# Patient Record
Sex: MALE | Race: WHITE | NOT HISPANIC OR LATINO
[De-identification: names, ages, dates, MRNs, and addresses within clinical notes are randomized per-mention and may not be internally consistent; named-entity substitution may affect disease eponyms.]

---

## 2017-08-07 ENCOUNTER — MESSAGE (OUTPATIENT)
Age: 72
End: 2017-08-07

## 2017-11-22 ENCOUNTER — INPATIENT (INPATIENT)
Facility: HOSPITAL | Age: 72
LOS: 2 days | Discharge: HOME | End: 2017-11-25
Attending: FAMILY MEDICINE | Admitting: FAMILY MEDICINE
Payer: MEDICARE

## 2017-11-22 VITALS
TEMPERATURE: 103 F | WEIGHT: 205.91 LBS | OXYGEN SATURATION: 99 % | SYSTOLIC BLOOD PRESSURE: 128 MMHG | RESPIRATION RATE: 18 BRPM | HEIGHT: 69 IN | DIASTOLIC BLOOD PRESSURE: 82 MMHG | HEART RATE: 106 BPM

## 2017-11-22 DIAGNOSIS — E89.0 POSTPROCEDURAL HYPOTHYROIDISM: Chronic | ICD-10-CM

## 2017-11-22 DIAGNOSIS — E11.9 TYPE 2 DIABETES MELLITUS WITHOUT COMPLICATIONS: ICD-10-CM

## 2017-11-22 DIAGNOSIS — Z98.89 OTHER SPECIFIED POSTPROCEDURAL STATES: Chronic | ICD-10-CM

## 2017-11-22 DIAGNOSIS — Z87.442 PERSONAL HISTORY OF URINARY CALCULI: Chronic | ICD-10-CM

## 2017-11-22 DIAGNOSIS — D68.61 ANTIPHOSPHOLIPID SYNDROME: ICD-10-CM

## 2017-11-22 DIAGNOSIS — I10 ESSENTIAL (PRIMARY) HYPERTENSION: ICD-10-CM

## 2017-11-22 DIAGNOSIS — H26.9 UNSPECIFIED CATARACT: Chronic | ICD-10-CM

## 2017-11-22 DIAGNOSIS — F32.9 MAJOR DEPRESSIVE DISORDER, SINGLE EPISODE, UNSPECIFIED: ICD-10-CM

## 2017-11-22 DIAGNOSIS — Z90.49 ACQUIRED ABSENCE OF OTHER SPECIFIED PARTS OF DIGESTIVE TRACT: Chronic | ICD-10-CM

## 2017-11-22 DIAGNOSIS — J18.9 PNEUMONIA, UNSPECIFIED ORGANISM: ICD-10-CM

## 2017-11-22 DIAGNOSIS — G70.00 MYASTHENIA GRAVIS WITHOUT (ACUTE) EXACERBATION: ICD-10-CM

## 2017-11-22 LAB
ALBUMIN SERPL ELPH-MCNC: 3.5 G/DL — SIGNIFICANT CHANGE UP (ref 3.3–5)
ALP SERPL-CCNC: 80 U/L — SIGNIFICANT CHANGE UP (ref 40–120)
ALT FLD-CCNC: 21 U/L — SIGNIFICANT CHANGE UP (ref 12–78)
ANION GAP SERPL CALC-SCNC: 8 MMOL/L — SIGNIFICANT CHANGE UP (ref 5–17)
APPEARANCE UR: CLEAR — SIGNIFICANT CHANGE UP
APTT BLD: 46.4 SEC — HIGH (ref 27.5–37.4)
AST SERPL-CCNC: 17 U/L — SIGNIFICANT CHANGE UP (ref 15–37)
BACTERIA # UR AUTO: ABNORMAL
BASOPHILS # BLD AUTO: 0.1 K/UL — SIGNIFICANT CHANGE UP (ref 0–0.2)
BASOPHILS NFR BLD AUTO: 0.7 % — SIGNIFICANT CHANGE UP (ref 0–2)
BILIRUB SERPL-MCNC: 0.4 MG/DL — SIGNIFICANT CHANGE UP (ref 0.2–1.2)
BILIRUB UR-MCNC: NEGATIVE — SIGNIFICANT CHANGE UP
BUN SERPL-MCNC: 26 MG/DL — HIGH (ref 7–23)
CALCIUM SERPL-MCNC: 8.7 MG/DL — SIGNIFICANT CHANGE UP (ref 8.5–10.1)
CHLORIDE SERPL-SCNC: 105 MMOL/L — SIGNIFICANT CHANGE UP (ref 96–108)
CO2 SERPL-SCNC: 25 MMOL/L — SIGNIFICANT CHANGE UP (ref 22–31)
COLOR SPEC: YELLOW — SIGNIFICANT CHANGE UP
CREAT SERPL-MCNC: 1.69 MG/DL — HIGH (ref 0.5–1.3)
DIFF PNL FLD: NEGATIVE — SIGNIFICANT CHANGE UP
EOSINOPHIL # BLD AUTO: 0.1 K/UL — SIGNIFICANT CHANGE UP (ref 0–0.5)
EOSINOPHIL NFR BLD AUTO: 0.6 % — SIGNIFICANT CHANGE UP (ref 0–6)
EPI CELLS # UR: SIGNIFICANT CHANGE UP
FLUAV SPEC QL CULT: NEGATIVE — SIGNIFICANT CHANGE UP
FLUBV AG SPEC QL IA: NEGATIVE — SIGNIFICANT CHANGE UP
GLUCOSE BLDC GLUCOMTR-MCNC: 143 MG/DL — HIGH (ref 70–99)
GLUCOSE BLDC GLUCOMTR-MCNC: 199 MG/DL — HIGH (ref 70–99)
GLUCOSE SERPL-MCNC: 192 MG/DL — HIGH (ref 70–99)
GLUCOSE UR QL: NEGATIVE MG/DL — SIGNIFICANT CHANGE UP
HCT VFR BLD CALC: 39.8 % — SIGNIFICANT CHANGE UP (ref 39–50)
HGB BLD-MCNC: 12.8 G/DL — LOW (ref 13–17)
INR BLD: 3.27 RATIO — HIGH (ref 0.88–1.16)
KETONES UR-MCNC: NEGATIVE — SIGNIFICANT CHANGE UP
LACTATE SERPL-SCNC: 1.6 MMOL/L — SIGNIFICANT CHANGE UP (ref 0.7–2)
LEUKOCYTE ESTERASE UR-ACNC: NEGATIVE — SIGNIFICANT CHANGE UP
LYMPHOCYTES # BLD AUTO: 0.6 K/UL — LOW (ref 1–3.3)
LYMPHOCYTES # BLD AUTO: 5.2 % — LOW (ref 13–44)
MCHC RBC-ENTMCNC: 27.9 PG — SIGNIFICANT CHANGE UP (ref 27–34)
MCHC RBC-ENTMCNC: 32.2 GM/DL — SIGNIFICANT CHANGE UP (ref 32–36)
MCV RBC AUTO: 86.8 FL — SIGNIFICANT CHANGE UP (ref 80–100)
MONOCYTES # BLD AUTO: 1 K/UL — HIGH (ref 0–0.9)
MONOCYTES NFR BLD AUTO: 9.1 % — SIGNIFICANT CHANGE UP (ref 2–14)
NEUTROPHILS # BLD AUTO: 9.7 K/UL — HIGH (ref 1.8–7.4)
NEUTROPHILS NFR BLD AUTO: 84.4 % — HIGH (ref 43–77)
NITRITE UR-MCNC: NEGATIVE — SIGNIFICANT CHANGE UP
PH UR: 5 — SIGNIFICANT CHANGE UP (ref 5–8)
PLATELET # BLD AUTO: 327 K/UL — SIGNIFICANT CHANGE UP (ref 150–400)
POTASSIUM SERPL-MCNC: 4.6 MMOL/L — SIGNIFICANT CHANGE UP (ref 3.5–5.3)
POTASSIUM SERPL-SCNC: 4.6 MMOL/L — SIGNIFICANT CHANGE UP (ref 3.5–5.3)
PROT SERPL-MCNC: 7.5 GM/DL — SIGNIFICANT CHANGE UP (ref 6–8.3)
PROT UR-MCNC: 30 MG/DL
PROTHROM AB SERPL-ACNC: 36.5 SEC — HIGH (ref 9.8–12.7)
RBC # BLD: 4.59 M/UL — SIGNIFICANT CHANGE UP (ref 4.2–5.8)
RBC # FLD: 13 % — SIGNIFICANT CHANGE UP (ref 11–15)
RBC CASTS # UR COMP ASSIST: SIGNIFICANT CHANGE UP /HPF (ref 0–4)
SODIUM SERPL-SCNC: 138 MMOL/L — SIGNIFICANT CHANGE UP (ref 135–145)
SP GR SPEC: 1.01 — SIGNIFICANT CHANGE UP (ref 1.01–1.02)
UROBILINOGEN FLD QL: NEGATIVE MG/DL — SIGNIFICANT CHANGE UP
WBC # BLD: 11.4 K/UL — HIGH (ref 3.8–10.5)
WBC # FLD AUTO: 11.4 K/UL — HIGH (ref 3.8–10.5)
WBC UR QL: SIGNIFICANT CHANGE UP

## 2017-11-22 PROCEDURE — 72170 X-RAY EXAM OF PELVIS: CPT | Mod: 26

## 2017-11-22 PROCEDURE — 71010: CPT | Mod: 26

## 2017-11-22 PROCEDURE — 70450 CT HEAD/BRAIN W/O DYE: CPT | Mod: 26

## 2017-11-22 PROCEDURE — 99223 1ST HOSP IP/OBS HIGH 75: CPT | Mod: AI

## 2017-11-22 PROCEDURE — 99285 EMERGENCY DEPT VISIT HI MDM: CPT

## 2017-11-22 PROCEDURE — 72125 CT NECK SPINE W/O DYE: CPT | Mod: 26

## 2017-11-22 PROCEDURE — 76377 3D RENDER W/INTRP POSTPROCES: CPT | Mod: 26

## 2017-11-22 RX ORDER — VENLAFAXINE HCL 75 MG
75 CAPSULE, EXT RELEASE 24 HR ORAL
Qty: 0 | Refills: 0 | Status: DISCONTINUED | OUTPATIENT
Start: 2017-11-22 | End: 2017-11-25

## 2017-11-22 RX ORDER — SODIUM CHLORIDE 9 MG/ML
1000 INJECTION, SOLUTION INTRAVENOUS
Qty: 0 | Refills: 0 | Status: DISCONTINUED | OUTPATIENT
Start: 2017-11-22 | End: 2017-11-25

## 2017-11-22 RX ORDER — SODIUM CHLORIDE 9 MG/ML
500 INJECTION INTRAMUSCULAR; INTRAVENOUS; SUBCUTANEOUS ONCE
Qty: 0 | Refills: 0 | Status: COMPLETED | OUTPATIENT
Start: 2017-11-22 | End: 2017-11-22

## 2017-11-22 RX ORDER — AZITHROMYCIN 500 MG/1
500 TABLET, FILM COATED ORAL EVERY 24 HOURS
Qty: 0 | Refills: 0 | Status: DISCONTINUED | OUTPATIENT
Start: 2017-11-23 | End: 2017-11-25

## 2017-11-22 RX ORDER — FAMOTIDINE 10 MG/ML
20 INJECTION INTRAVENOUS DAILY
Qty: 0 | Refills: 0 | Status: DISCONTINUED | OUTPATIENT
Start: 2017-11-22 | End: 2017-11-25

## 2017-11-22 RX ORDER — CARVEDILOL PHOSPHATE 80 MG/1
12.5 CAPSULE, EXTENDED RELEASE ORAL EVERY 12 HOURS
Qty: 0 | Refills: 0 | Status: DISCONTINUED | OUTPATIENT
Start: 2017-11-22 | End: 2017-11-23

## 2017-11-22 RX ORDER — PYRIDOSTIGMINE BROMIDE 60 MG/5ML
60 SOLUTION ORAL THREE TIMES A DAY
Qty: 0 | Refills: 0 | Status: DISCONTINUED | OUTPATIENT
Start: 2017-11-22 | End: 2017-11-22

## 2017-11-22 RX ORDER — PIPERACILLIN AND TAZOBACTAM 4; .5 G/20ML; G/20ML
3.38 INJECTION, POWDER, LYOPHILIZED, FOR SOLUTION INTRAVENOUS EVERY 8 HOURS
Qty: 0 | Refills: 0 | Status: DISCONTINUED | OUTPATIENT
Start: 2017-11-22 | End: 2017-11-23

## 2017-11-22 RX ORDER — DEXTROSE 50 % IN WATER 50 %
25 SYRINGE (ML) INTRAVENOUS ONCE
Qty: 0 | Refills: 0 | Status: DISCONTINUED | OUTPATIENT
Start: 2017-11-22 | End: 2017-11-25

## 2017-11-22 RX ORDER — LEVOTHYROXINE SODIUM 125 MCG
150 TABLET ORAL DAILY
Qty: 0 | Refills: 0 | Status: DISCONTINUED | OUTPATIENT
Start: 2017-11-22 | End: 2017-11-25

## 2017-11-22 RX ORDER — ASPIRIN/CALCIUM CARB/MAGNESIUM 324 MG
81 TABLET ORAL DAILY
Qty: 0 | Refills: 0 | Status: DISCONTINUED | OUTPATIENT
Start: 2017-11-22 | End: 2017-11-25

## 2017-11-22 RX ORDER — ACETAMINOPHEN 500 MG
650 TABLET ORAL ONCE
Qty: 0 | Refills: 0 | Status: COMPLETED | OUTPATIENT
Start: 2017-11-22 | End: 2017-11-22

## 2017-11-22 RX ORDER — SODIUM CHLORIDE 9 MG/ML
1000 INJECTION INTRAMUSCULAR; INTRAVENOUS; SUBCUTANEOUS
Qty: 0 | Refills: 0 | Status: DISCONTINUED | OUTPATIENT
Start: 2017-11-22 | End: 2017-11-25

## 2017-11-22 RX ORDER — GLUCAGON INJECTION, SOLUTION 0.5 MG/.1ML
1 INJECTION, SOLUTION SUBCUTANEOUS ONCE
Qty: 0 | Refills: 0 | Status: DISCONTINUED | OUTPATIENT
Start: 2017-11-22 | End: 2017-11-25

## 2017-11-22 RX ORDER — PIPERACILLIN AND TAZOBACTAM 4; .5 G/20ML; G/20ML
3.38 INJECTION, POWDER, LYOPHILIZED, FOR SOLUTION INTRAVENOUS ONCE
Qty: 0 | Refills: 0 | Status: COMPLETED | OUTPATIENT
Start: 2017-11-22 | End: 2017-11-22

## 2017-11-22 RX ORDER — AZITHROMYCIN 500 MG/1
TABLET, FILM COATED ORAL
Qty: 0 | Refills: 0 | Status: DISCONTINUED | OUTPATIENT
Start: 2017-11-22 | End: 2017-11-25

## 2017-11-22 RX ORDER — MYCOPHENOLATE MOFETIL 250 MG/1
1500 CAPSULE ORAL
Qty: 0 | Refills: 0 | Status: DISCONTINUED | OUTPATIENT
Start: 2017-11-22 | End: 2017-11-25

## 2017-11-22 RX ORDER — METRONIDAZOLE 500 MG
500 TABLET ORAL ONCE
Qty: 0 | Refills: 0 | Status: COMPLETED | OUTPATIENT
Start: 2017-11-22 | End: 2017-11-22

## 2017-11-22 RX ORDER — DEXTROSE 50 % IN WATER 50 %
1 SYRINGE (ML) INTRAVENOUS ONCE
Qty: 0 | Refills: 0 | Status: DISCONTINUED | OUTPATIENT
Start: 2017-11-22 | End: 2017-11-25

## 2017-11-22 RX ORDER — INSULIN LISPRO 100/ML
VIAL (ML) SUBCUTANEOUS
Qty: 0 | Refills: 0 | Status: DISCONTINUED | OUTPATIENT
Start: 2017-11-22 | End: 2017-11-25

## 2017-11-22 RX ORDER — AZITHROMYCIN 500 MG/1
500 TABLET, FILM COATED ORAL ONCE
Qty: 0 | Refills: 0 | Status: COMPLETED | OUTPATIENT
Start: 2017-11-22 | End: 2017-11-22

## 2017-11-22 RX ORDER — DEXTROSE 50 % IN WATER 50 %
12.5 SYRINGE (ML) INTRAVENOUS ONCE
Qty: 0 | Refills: 0 | Status: DISCONTINUED | OUTPATIENT
Start: 2017-11-22 | End: 2017-11-25

## 2017-11-22 RX ORDER — INSULIN GLARGINE 100 [IU]/ML
10 INJECTION, SOLUTION SUBCUTANEOUS AT BEDTIME
Qty: 0 | Refills: 0 | Status: DISCONTINUED | OUTPATIENT
Start: 2017-11-22 | End: 2017-11-23

## 2017-11-22 RX ADMIN — Medication 650 MILLIGRAM(S): at 19:15

## 2017-11-22 RX ADMIN — SODIUM CHLORIDE 1000 MILLILITER(S): 9 INJECTION INTRAMUSCULAR; INTRAVENOUS; SUBCUTANEOUS at 19:14

## 2017-11-22 RX ADMIN — PIPERACILLIN AND TAZOBACTAM 200 GRAM(S): 4; .5 INJECTION, POWDER, LYOPHILIZED, FOR SOLUTION INTRAVENOUS at 20:37

## 2017-11-22 RX ADMIN — SODIUM CHLORIDE 80 MILLILITER(S): 9 INJECTION INTRAMUSCULAR; INTRAVENOUS; SUBCUTANEOUS at 22:59

## 2017-11-22 RX ADMIN — AZITHROMYCIN 255 MILLIGRAM(S): 500 TABLET, FILM COATED ORAL at 22:58

## 2017-11-22 RX ADMIN — Medication 100 MILLIGRAM(S): at 21:27

## 2017-11-22 NOTE — H&P ADULT - ASSESSMENT
71 y/o man with multiple medical problems presents after falling out of bed today, he has no significant injury, but has had cough, fever, leukocytosis and imaging consistent with pneumonia. Pt is on Cellcept, and should have broad antibiotic coverage. Pt seen by Neurology who feels, myasthenia is stable.

## 2017-11-22 NOTE — ED ADULT TRIAGE NOTE - CHIEF COMPLAINT QUOTE
pt fell out of bed, denies any head injury or loc per spouse stated by EMS. Pt was unable to get up from floor due to generalize weakness. Pt has hx of myasthenia gravis

## 2017-11-22 NOTE — H&P ADULT - PROBLEM SELECTOR PLAN 1
cultures, broad spectrum antibiotics, trend CBC, lactate, follow up cultures, viral studies  ID consult

## 2017-11-22 NOTE — H&P ADULT - NSHPREVIEWOFSYSTEMS_GEN_ALL_CORE
REVIEW OF SYSTEMS:  CONSTITUTIONAL: + fever, no weight loss, or fatigue  EYES: No eye pain, visual disturbances, or discharge  ENMT:  No difficulty hearing, tinnitus, vertigo; No sinus or throat pain  NECK: No pain or stiffness  RESPIRATORY: +cough, no wheezing, chills or hemoptysis; No shortness of breath  CARDIOVASCULAR: No chest pain, palpitations, dizziness, or leg swelling  GASTROINTESTINAL: No abdominal or epigastric pain. No nausea, vomiting, or hematemesis; No diarrhea or constipation. No melena or hematochezia.  GENITOURINARY: No dysuria, frequency, hematuria, or incontinence  NEUROLOGICAL: No headaches, memory loss, loss of strength, numbness, or tremors  SKIN: No itching, burning, rashes, or lesions   LYMPH NODES: No enlarged glands  ENDOCRINE: No heat or cold intolerance; No hair loss  MUSCULOSKELETAL: No joint pain or swelling; No muscle, back, or extremity pain  PSYCHIATRIC: No depression, anxiety, mood swings, or difficulty sleeping  HEME/LYMPH: No easy bruising, or bleeding gums  ALLERGY AND IMMUNOLOGIC: No hives or eczema

## 2017-11-22 NOTE — H&P ADULT - NSHPLABSRESULTS_GEN_ALL_CORE
LABS:                        12.8   11.4  )-----------( 327      ( 22 Nov 2017 19:23 )             39.8     11-22    138  |  105  |  26<H>  ----------------------------<  192<H>  4.6   |  25  |  1.69<H>    Ca    8.7      22 Nov 2017 19:23    TPro  7.5  /  Alb  3.5  /  TBili  0.4  /  DBili  x   /  AST  17  /  ALT  21  /  AlkPhos  80  11-22    PT/INR - ( 22 Nov 2017 19:23 )   PT: 36.5 sec;   INR: 3.27 ratio         PTT - ( 22 Nov 2017 19:23 )  PTT:46.4 sec    CAPILLARY BLOOD GLUCOSE          RADIOLOGY & ADDITIONAL TESTS:    Imaging Personally Reviewed:  [ ] YES  [ ] NO LABS:                        12.8   11.4  )-----------( 327      ( 22 Nov 2017 19:23 )             39.8     11-22    138  |  105  |  26<H>  ----------------------------<  192<H>  4.6   |  25  |  1.69<H>    Ca    8.7      22 Nov 2017 19:23    TPro  7.5  /  Alb  3.5  /  TBili  0.4  /  DBili  x   /  AST  17  /  ALT  21  /  AlkPhos  80  11-22    PT/INR - ( 22 Nov 2017 19:23 )   PT: 36.5 sec;   INR: 3.27 ratio         PTT - ( 22 Nov 2017 19:23 )  PTT:46.4 sec    CAPILLARY BLOOD GLUCOSE  Lactate, Blood: 1.6 mmol/L (11.22.17 @ 19:23)          RADIOLOGY & ADDITIONAL TESTS:   Xray Chest 1 View AP/PA. (11.22.17 @ 19:48) >   Diffuse interstitial prominence may be infectious or   inflammatory in etiology and appears new from the previous examination    Xray Pelvis AP only (11.22.17 @ 19:47) >    No evidence of fracture  Imaging Personally Reviewed:  [ x] YES  [ ] NO  EKG: pending

## 2017-11-22 NOTE — H&P ADULT - HISTORY OF PRESENT ILLNESS
71 y/o male with multiple medical problems, myasthenia gravis presents after fall from bed at home shortly before presentation. Patient states that he did not pass out, denies loc, neck pain, chest pain, shortness of breath, abdominal pain, syncope. Patient received assistance from EMS and was told he had fever. 73 y/o male with PMH myasthenia gravis, DM2, HLD, HTN, CVA, Antiphospholipid antibody syndrome on coumadin,  presents after fall from bed at home shortly before presentation. Patient states that he did not pass out, he slipped out of bed, denies head trauma, denies LOC, neck pain, chest pain, shortness of breath, abdominal pain, syncope. Patient received assistance from EMS and was told he had fever. Pt has been coughing past few days, non productive, with subjective fever, no SOB, wheezing, dysphagia, N/V, dysuria or rash.  Pt had flu shot this year and pneumococcal vaccine about a year ago; he denies sick contacts or recent travel.

## 2017-11-22 NOTE — ED ADULT NURSE NOTE - PSH
Cataract  h/o b/l  History of kidney stones  & multipe surgical procedure for hydronephrosis  S/P carpal tunnel release    S/P cervical discectomy    S/P cholecystectomy    S/P thyroidectomy  Partial right  S/P trigger finger release  Bilateral X 8  surgical procedures

## 2017-11-22 NOTE — CONSULT NOTE ADULT - SUBJECTIVE AND OBJECTIVE BOX
Subjective Complaints:  Historian:       Consult requested by ER doctor:                  Attending:     HPI:   RENETTA PARK.  · Chief Complaint: The patient is a 72y Male complaining of weakness.  71 yo male with multiple medical problems, myasthenia gravis presents after fall from bed at home shortly before presentation. Patient states that he did not pass out, denies loc, neck pain, chest pain, shortness of breath, abdominal pain, syncope. Patient received assistance from EMS and was told he had fever.      PAST MEDICAL & SURGICAL HISTORY:  Urge incontinence; Antiphospholipid antibody syndrome; DM (diabetes mellitus); Hypothyroid; HLD (hyperlipidemia); HTN (hypertension); Depression; Anxiety; CVA (cerebral vascular accident); Hard of hearing; Kidney stones; Myasthenia gravis; Obesity (BMI 30.0-34.9); Cataract: h/o b/l; S/P carpal tunnel release; History of kidney stones: &amp; multiple surgical procedure for hydronephrosis; S/P trigger finger release: Bilateral X 8  surgical procedures; S/P cervical discectomy; S/P thyroidectomy: Partial right; S/P cholecystectomy    MEDICATIONS  (STANDING):  metroNIDAZOLE  IVPB 500 milliGRAM(s) IV Intermittent Once  piperacillin/tazobactam IVPB. 3.375 Gram(s) IV Intermittent once    MEDICATIONS  (PRN):  Allergies: fish (Other); iodine (Other); IV Contrast (Urticaria; Hives); shellfish (Other); sulfa drugs (Other)  Intolerances  FAMILY HISTORY:  No pertinent family history in first degree relatives    Vital Signs Last 24 Hrs  T(C): 39.2 (22 Nov 2017 17:56), Max: 39.2 (22 Nov 2017 17:56)  T(F): 102.5 (22 Nov 2017 17:56), Max: 102.5 (22 Nov 2017 17:56)  HR: 106 (22 Nov 2017 17:56) (106 - 106)  BP: 128/82 (22 Nov 2017 17:56) (128/82 - 128/82)  BP(mean): --  RR: 18 (22 Nov 2017 17:56) (18 - 18)  SpO2: 99% (22 Nov 2017 17:56) (99% - 99%)    NEUROLOGICAL EXAM:  HENT:  Normocephalic head; atraumatic head.  Neck supple.  ENT: normal looking.  Mental State:    Alert.  Fully oriented to person, place and date.  Coherent.  Speech clear and intact.  Cooperative.  Responds appropriately.    Cranial Nerves:  II-XII:   Pupils round and reactive to light and accommodation.  Extraocular movements full.  Visual fields full (no homonymous hemianopsia).  Visual acuity wnl.  Facial symmetry intact.  Tongue midline.  Motor Functions:  Moves all extremities.  No pronator drift of UE.  Claps hand well.  Hand  intact bilaterally.  Ambulatory.    Sensory Functions:   Intact to touch and pinprick to face and extremities.    Reflexes:  Deep tendon reflexes normoactive to biceps, knees and ankles.  Babinski absent (present).  Cerebellar Testing:    Finger to nose intact.  Nystagmus absent.  Neurovascular: Carotid auscultation full without bruits.      LABS:                        12.8   11.4  )-----------( 327      ( 22 Nov 2017 19:23 )             39.8     11-22    138  |  105  |  26<H>  ----------------------------<  192<H>  4.6   |  25  |  1.69<H>    Ca    8.7      22 Nov 2017 19:23    TPro  7.5  /  Alb  3.5  /  TBili  0.4  /  DBili  x   /  AST  17  /  ALT  21  /  AlkPhos  80  11-22    PT/INR - ( 22 Nov 2017 19:23 )   PT: 36.5 sec;   INR: 3.27 ratio         PTT - ( 22 Nov 2017 19:23 )  PTT:46.4 sec        RADIOLOGY & ADDITIONAL STUDIES:    CT Head No Cont: Urgent   Indication: fall  Transport: [facility]  Provider's Contact #: [phone] (11-22 @ 18:23)  CT Cervical Spine No Cont: Urgent   Indication: fall  Transport: [facility]ret[facility]  Provider's Contact #: [phone] (11-22 @ 18:23)  Complete Blood Count + Automated Diff: STAT (11-22 @ 18:49)  Comprehensive Metabolic Panel: STAT (11-22 @ 18:49)  Lactate, Blood: STAT (11-22 @ 18:49)  Culture - Blood: Routine  Specimen Source: Blood-Venous (11-22 @ 18:49)  Culture - Blood: Routine  Specimen Source: Blood-Venous (11-22 @ 18:49)  Urinalysis + Microscopic Examination: STAT (11-22 @ 18:49)  Culture - Urine: Routine  Specimen Source: Clean Catch (Midstream) (11-22 @ 18:49)  acetaminophen   Tablet: [Known as TYLENOL]  650 milliGRAM(s), Oral, once, Stop After 1 Doses  Administration Instructions: MAX DAILY DOSE:  ADULT = 4,000 mG/Day (11-22 @ 18:49)  Xray Chest 1 View AP/PA.: Urgent   Indication: cough  Transport: [facility]ret[facility]Crib  Exam Completed  Provider's Contact #: [phone] (11-22 @ 18:49)  IV Insert:     Time/Priority:  STAT (11-22 @ 18:49)  sodium chloride 0.9% Bolus:   500 milliLiter(s), IV Bolus, once, infuse over 30 Minute(s), Stop After 1 Doses  Provider's Contact #: [phone] (11-22 @ 18:49)  Prothrombin Time and INR, Plasma:  Start Date:22-Nov-2017. STAT (11-22 @ 18:49)  Activated Partial Thromboplastin Time:  Start Date:22-Nov-2017. STAT (11-22 @ 18:49)  Xray Pelvis AP only: Urgent   Indication: fall  Transport: Stret[facility]-Crib  Exam Completed  Provider's Contact #: [phone] (11-22 @ 18:49)  piperacillin/tazobactam IVPB.: [Known as ZOSYN IVPB.]  3.375 Gram(s) in dextrose 5% 100 milliLiter(s), IV Intermittent, once, infuse over 30 Minute(s), Stop After 1 Doses  Indication: pneumonia  Special Instructions: LOADING DOSE (11-22 @ 19:51)  metroNIDAZOLE  IVPB: [Ordered as FLAGYL IVPB]  500 milliGRAM(s) in IV Solution 100 milliLiter(s), IV Intermittent, Once, infuse over 60 Minute(s), Stop After 1 Doses  Indication: aspiration pneumonia  Administration Instructions: DO NOT Refrigerate  This is a Look-alike/Sound-alike Medication  Provider's Contact #: [phone] (11-22 @ 19:51)  Admit to Inpatient Level of Care.:     Service:  MEDICAL/SURGICAL    Physician:  Kailyn Cole    Additional Instructions:  Diagnosis: Pneumonia  Isolation: None  Special Consideration: None (11-22 @ 20:17)  (Floorstock):   Qty Removed: 1 each (11-22 @ 20:19)  Admit to Inpatient Level of Care.:     Service:  Telemetry    Physician:  Douglas (11-22 @ 20:21)  Remote Telemetry/EKG EXCEPT Off Unit Tests:     Time/Priority:  Routine (11-22 @ 20:21)  Vital Signs:     Frequency:  per routine (11-22 @ 20:21)  Activity - Bedrest (11-22 @ 20:21)      Assessment & Opinion:    Recommendations:  Brain MRI.  Carotid doppler.  Echocardiogram.  EEG.   DVT prophylaxis as ordered.  Medications: Historian:   Patient.      HPI:   RENETTA MALDONADOINO.  · Chief Complaint: The patient is a 72y Male complaining of weakness.  Patient stated he slipped off his bed.    73 yo male with multiple medical problems, Myasthenia Gravis as foremost maintained on Cellcept 500 mg 3 tablets BID.  He presents after fall from bed at home shortly before presentation. Patient states that he did not pass out, denies loc, neck pain, chest pain, shortness of breath, abdominal pain, syncope. Patient received assistance from EMS and was told he had fever.   Note: Patient was on Mestinon many years back, but now on Cellcept for the past 12 years.    PAST MEDICAL & SURGICAL HISTORY:  Urge incontinence; Antiphospholipid antibody syndrome; DM (diabetes mellitus); Hypothyroid; HLD (hyperlipidemia); HTN (hypertension); Depression; Anxiety; CVA (cerebral vascular accident); Hard of hearing; Kidney stones; Myasthenia gravis; Obesity (BMI 30.0-34.9); Cataract: h/o b/l; S/P carpal tunnel release; History of kidney stones: &amp; multiple surgical procedure for hydronephrosis; S/P trigger finger release: Bilateral X 8  surgical procedures; S/P cervical discectomy; S/P thyroidectomy: Partial right; S/P cholecystectomy    MEDICATIONS  (STANDING):  metroNIDAZOLE  IVPB 500 milliGRAM(s) IV Intermittent Once  piperacillin/tazobactam IVPB. 3.375 Gram(s) IV Intermittent once    MEDICATIONS  (PRN):  Allergies: fish (Other); iodine (Other); IV Contrast (Urticaria; Hives); shellfish (Other); sulfa drugs (Other)  Intolerances  FAMILY HISTORY:  No pertinent family history in first degree relatives    Vital Signs Last 24 Hrs  T(C): 39.2 (22 Nov 2017 17:56), Max: 39.2 (22 Nov 2017 17:56)  T(F): 102.5 (22 Nov 2017 17:56), Max: 102.5 (22 Nov 2017 17:56)  HR: 106 (22 Nov 2017 17:56) (106 - 106)  BP: 128/82 (22 Nov 2017 17:56) (128/82 - 128/82)  BP(mean): --  RR: 18 (22 Nov 2017 17:56) (18 - 18)  SpO2: 99% (22 Nov 2017 17:56) (99% - 99%)    NEUROLOGICAL EXAM:  HENT:  Normocephalic head; atraumatic head.  Neck supple.  ENT: normal looking.  Mental State:    Alert.  Fully oriented to person, place and date.  Coherent.  Speech clear and intact.  Cooperative.  Responds appropriately.    Cranial Nerves:  II-XII:   Pupils round and reactive to light and accommodation.  Extraocular movements full.  Visual fields full (no homonymous hemianopsia).  Visual acuity wnl.  Facial symmetry intact.  Tongue midline.  Motor Functions:  Moves all extremities.  No pronator drift of UE.  Claps hands well.  Hand  intact bilaterally.    Sensory Functions:   Intact to touch and pinprick to face and extremities.    Reflexes:  Deep tendon reflexes normoactive to biceps, knees and ankles.    Cerebellar Testing:    Finger to nose intact.  Nystagmus absent.  Neurovascular: Carotid auscultation full without bruits.      LABS:                        12.8   11.4  )-----------( 327      ( 22 Nov 2017 19:23 )             39.8     11-22    138  |  105  |  26<H>  ----------------------------<  192<H>  4.6   |  25  |  1.69<H>    Ca    8.7      22 Nov 2017 19:23    TPro  7.5  /  Alb  3.5  /  TBili  0.4  /  DBili  x   /  AST  17  /  ALT  21  /  AlkPhos  80  11-22    PT/INR - ( 22 Nov 2017 19:23 )   PT: 36.5 sec;   INR: 3.27 ratio         PTT - ( 22 Nov 2017 19:23 )  PTT:46.4 sec    RADIOLOGY & ADDITIONAL STUDIES:    CT Head No Cont: Urgent   Indication: fall  Transport: [facility]  Provider's Contact #: [phone] (11-22 @ 18:23)  CT Cervical Spine No Cont: Urgent   Indication: fall  Transport: [facility]  Provider's Contact #: [phone] (11-22 @ 18:23)  Complete Blood Count + Automated Diff: STAT (11-22 @ 18:49)  Comprehensive Metabolic Panel: STAT (11-22 @ 18:49)  Lactate, Blood: STAT (11-22 @ 18:49)  Culture - Blood: Routine  Specimen Source: Blood-Venous (11-22 @ 18:49)  Culture - Blood: Routine  Specimen Source: Blood-Venous (11-22 @ 18:49)  Urinalysis + Microscopic Examination: STAT (11-22 @ 18:49)  Culture - Urine: Routine  Specimen Source: Clean Catch (Midstream) (11-22 @ 18:49)  acetaminophen   Tablet: [Known as TYLENOL]  650 milliGRAM(s), Oral, once, Stop After 1 Doses  Administration Instructions: MAX DAILY DOSE:  ADULT = 4,000 mG/Day (11-22 @ 18:49)  Xray Chest 1 View AP/PA.: Urgent   Indication: cough  Transport: [facility]ret[facility]-Crib  Exam Completed  Provider's Contact #: [phone] (11-22 @ 18:49)  IV Insert:     Time/Priority:  STAT (11-22 @ 18:49)  sodium chloride 0.9% Bolus:   500 milliLiter(s), IV Bolus, once, infuse over 30 Minute(s), Stop After 1 Doses  Provider's Contact #: [phone] (11-22 @ 18:49)  Prothrombin Time and INR, Plasma:  Start Date:22-Nov-2017. STAT (11-22 @ 18:49)  Activated Partial Thromboplastin Time:  Start Date:22-Nov-2017. STAT (11-22 @ 18:49)  Xray Pelvis AP only: Urgent   Indication: fall  Transport: [facility]ret[facility]-Crib  Exam Completed  Provider's Contact #: [phone] (11-22 @ 18:49)  piperacillin/tazobactam IVPB.: [Known as ZOSYN IVPB.]  3.375 Gram(s) in dextrose 5% 100 milliLiter(s), IV Intermittent, once, infuse over 30 Minute(s), Stop After 1 Doses  Indication: pneumonia  Special Instructions: LOADING DOSE (11-22 @ 19:51)  metroNIDAZOLE  IVPB: [Ordered as FLAGYL IVPB]  500 milliGRAM(s) in IV Solution 100 milliLiter(s), IV Intermittent, Once, infuse over 60 Minute(s), Stop After 1 Doses  Indication: aspiration pneumonia  Administration Instructions: DO NOT Refrigerate  This is a Look-alike/Sound-alike Medication  Provider's Contact #: [phone] (11-22 @ 19:51)  Admit to Inpatient Level of Care.:     Service:  MEDICAL/SURGICAL    Physician:  Kailyn Cole    Additional Instructions:  Diagnosis: Pneumonia  Isolation: None  Special Consideration: None (11-22 @ 20:17)  (Floorstock):   Qty Removed: 1 each (11-22 @ 20:19)  Admit to Inpatient Level of Care.:     Service:  Telemetry    Physician:  Douglas (11-22 @ 20:21)  Remote Telemetry/EKG EXCEPT Off Unit Tests:     Time/Priority:  Routine (11-22 @ 20:21)  Vital Signs:     Frequency:  per routine (11-22 @ 20:21)  Activity - Bedrest (11-22 @ 20:21)    Assessment & Opinion:  Stable Myasthenia Gravis of many years on Cellcept.  History of fall.  Non-focal neurologic examination.  Recommendations:   Echocardiogram.    DVT prophylaxis as ordered.  Medications:  Continue all medications.  Continue Cellcept

## 2017-11-22 NOTE — ED PROVIDER NOTE - OBJECTIVE STATEMENT
73 yo male with multiple medical problems, myasthenia gravis presents after fall from bed at home shortly before presentation. Patient states that he did not pass out, denies loc, neck pain, chest pain, shortness of breath, abdominal pain, syncope. Patient received assistance from EMS and was told he had fever.

## 2017-11-22 NOTE — ED PROVIDER NOTE - CONSTITUTIONAL, MLM
normal... Well appearing, well nourished, awake, alert, oriented to person, place, time/situation and in no apparent distress, coughing

## 2017-11-22 NOTE — ED ADULT NURSE NOTE - PMH
Antiphospholipid antibody syndrome    Anxiety    CVA (cerebral vascular accident)    Depression    DM (diabetes mellitus)    Hard of hearing    HLD (hyperlipidemia)    HTN (hypertension)    Hypothyroid    Kidney stones    Myasthenia gravis    Obesity (BMI 30.0-34.9)    Urge incontinence

## 2017-11-22 NOTE — H&P ADULT - NSHPPHYSICALEXAM_GEN_ALL_CORE
T(C): 39.2 (22 Nov 2017 17:56), Max: 39.2 (22 Nov 2017 17:56)  T(F): 102.5 (22 Nov 2017 17:56), Max: 102.5 (22 Nov 2017 17:56)  HR: 106 (22 Nov 2017 17:56) (106 - 106)  BP: 128/82 (22 Nov 2017 17:56) (128/82 - 128/82)  BP(mean): --  RR: 18 (22 Nov 2017 17:56) (18 - 18)  SpO2: 99% (22 Nov 2017 17:56) (99% - 99%)    PHYSICAL EXAM:  GENERAL: NAD, well-groomed, well-developed  HEAD:  Atraumatic, Normocephalic  EYES: EOMI, PERRLA, conjunctiva and sclera clear  ENMT: No tonsillar erythema, exudates, or enlargement; Moist mucous membranes, Good dentition, No lesions  NECK: Supple, No JVD, Normal thyroid  NERVOUS SYSTEM:  Alert & Oriented X3, Good concentration; Motor Strength 5/5 B/L upper and lower extremities; DTRs 2+ intact and symmetric  CHEST/LUNG: Clear to percussion bilaterally; No rales, rhonchi, wheezing, or rubs  HEART: Regular rate and rhythm; No murmurs, rubs, or gallops  ABDOMEN: Soft, Nontender, Nondistended; Bowel sounds present  EXTREMITIES:  2+ Peripheral Pulses, No clubbing, cyanosis, or edema  LYMPH: No lymphadenopathy noted  SKIN: No rashes or lesions    Care Discussed with Consultants/Other Providers [ x] YES  [ ] NO T(C): 39.2 (22 Nov 2017 17:56), Max: 39.2 (22 Nov 2017 17:56)  T(F): 102.5 (22 Nov 2017 17:56), Max: 102.5 (22 Nov 2017 17:56)  HR: 106 (22 Nov 2017 17:56) (106 - 106)  BP: 128/82 (22 Nov 2017 17:56) (128/82 - 128/82)  BP(mean): --  RR: 18 (22 Nov 2017 17:56) (18 - 18)  SpO2: 99% (22 Nov 2017 17:56) (99% - 99%)    PHYSICAL EXAM:  GENERAL: NAD, well-groomed, well-developed  HEAD:  Atraumatic, Normocephalic  EYES: EOMI, PERRLA, conjunctiva and sclera clear  ENMT: No tonsillar erythema, exudates, or enlargement; Moist mucous membranes, No lesions  NECK: Supple, No JVD  NERVOUS SYSTEM:  Alert & Oriented X3, CN 2-12 intact; Motor Strength 5/5 B/L upper and lower extremities; DTRs 2+ intact and symmetric, no sensory deficit  CHEST/LUNG: Clear to percussion bilaterally; No rales, rhonchi, wheezing, or rubs  HEART: Regular rate and rhythm; No murmurs, rubs, or gallops  ABDOMEN: Soft, Nontender, Nondistended; Bowel sounds present  EXTREMITIES:  + Peripheral Pulses, No clubbing, cyanosis, or edema  LYMPH: No lymphadenopathy noted  SKIN: No rashes or lesions    Care Discussed with Consultants/Other Providers [ x] YES  [ ] NO

## 2017-11-22 NOTE — ED ADULT NURSE NOTE - OBJECTIVE STATEMENT
received pt lying on stretcher stated that he slide off the edge of the bed and couldn't getting up denies any dizziness or headache denies ant pain

## 2017-11-22 NOTE — CONSULT NOTE ADULT - CONSULT REASON
Myasthenia Gravis with generalized weakness Evaluation of Myasthenia Gravis with generalized weakness

## 2017-11-23 DIAGNOSIS — E03.9 HYPOTHYROIDISM, UNSPECIFIED: ICD-10-CM

## 2017-11-23 DIAGNOSIS — F32.9 MAJOR DEPRESSIVE DISORDER, SINGLE EPISODE, UNSPECIFIED: ICD-10-CM

## 2017-11-23 DIAGNOSIS — J15.3 PNEUMONIA DUE TO STREPTOCOCCUS, GROUP B: ICD-10-CM

## 2017-11-23 LAB
ANION GAP SERPL CALC-SCNC: 7 MMOL/L — SIGNIFICANT CHANGE UP (ref 5–17)
APTT BLD: 43.8 SEC — HIGH (ref 27.5–37.4)
BASOPHILS # BLD AUTO: 0.1 K/UL — SIGNIFICANT CHANGE UP (ref 0–0.2)
BASOPHILS NFR BLD AUTO: 0.7 % — SIGNIFICANT CHANGE UP (ref 0–2)
BUN SERPL-MCNC: 23 MG/DL — SIGNIFICANT CHANGE UP (ref 7–23)
CALCIUM SERPL-MCNC: 9.2 MG/DL — SIGNIFICANT CHANGE UP (ref 8.5–10.1)
CHLORIDE SERPL-SCNC: 107 MMOL/L — SIGNIFICANT CHANGE UP (ref 96–108)
CO2 SERPL-SCNC: 27 MMOL/L — SIGNIFICANT CHANGE UP (ref 22–31)
CREAT SERPL-MCNC: 1.53 MG/DL — HIGH (ref 0.5–1.3)
CULTURE RESULTS: NO GROWTH — SIGNIFICANT CHANGE UP
EOSINOPHIL # BLD AUTO: 0.1 K/UL — SIGNIFICANT CHANGE UP (ref 0–0.5)
EOSINOPHIL NFR BLD AUTO: 1.2 % — SIGNIFICANT CHANGE UP (ref 0–6)
GLUCOSE BLDC GLUCOMTR-MCNC: 102 MG/DL — HIGH (ref 70–99)
GLUCOSE BLDC GLUCOMTR-MCNC: 111 MG/DL — HIGH (ref 70–99)
GLUCOSE BLDC GLUCOMTR-MCNC: 188 MG/DL — HIGH (ref 70–99)
GLUCOSE BLDC GLUCOMTR-MCNC: 83 MG/DL — SIGNIFICANT CHANGE UP (ref 70–99)
GLUCOSE SERPL-MCNC: 116 MG/DL — HIGH (ref 70–99)
HBA1C BLD-MCNC: 6.2 % — HIGH (ref 4–5.6)
HCT VFR BLD CALC: 34.7 % — LOW (ref 39–50)
HGB BLD-MCNC: 11.3 G/DL — LOW (ref 13–17)
INR BLD: 3.02 RATIO — HIGH (ref 0.88–1.16)
LACTATE SERPL-SCNC: 0.8 MMOL/L — SIGNIFICANT CHANGE UP (ref 0.7–2)
LYMPHOCYTES # BLD AUTO: 1.1 K/UL — SIGNIFICANT CHANGE UP (ref 1–3.3)
LYMPHOCYTES # BLD AUTO: 11.8 % — LOW (ref 13–44)
MCHC RBC-ENTMCNC: 27.9 PG — SIGNIFICANT CHANGE UP (ref 27–34)
MCHC RBC-ENTMCNC: 32.6 GM/DL — SIGNIFICANT CHANGE UP (ref 32–36)
MCV RBC AUTO: 85.8 FL — SIGNIFICANT CHANGE UP (ref 80–100)
MONOCYTES # BLD AUTO: 1 K/UL — HIGH (ref 0–0.9)
MONOCYTES NFR BLD AUTO: 10.7 % — SIGNIFICANT CHANGE UP (ref 2–14)
NEUTROPHILS # BLD AUTO: 6.8 K/UL — SIGNIFICANT CHANGE UP (ref 1.8–7.4)
NEUTROPHILS NFR BLD AUTO: 75.6 % — SIGNIFICANT CHANGE UP (ref 43–77)
NT-PROBNP SERPL-SCNC: 395 PG/ML — HIGH (ref 0–125)
PLATELET # BLD AUTO: 266 K/UL — SIGNIFICANT CHANGE UP (ref 150–400)
POTASSIUM SERPL-MCNC: 4.2 MMOL/L — SIGNIFICANT CHANGE UP (ref 3.5–5.3)
POTASSIUM SERPL-SCNC: 4.2 MMOL/L — SIGNIFICANT CHANGE UP (ref 3.5–5.3)
PROCALCITONIN SERPL-MCNC: 0.17 NG/ML — HIGH (ref 0–0.04)
PROTHROM AB SERPL-ACNC: 33.7 SEC — HIGH (ref 9.8–12.7)
RBC # BLD: 4.05 M/UL — LOW (ref 4.2–5.8)
RBC # FLD: 13.5 % — SIGNIFICANT CHANGE UP (ref 11–15)
SODIUM SERPL-SCNC: 141 MMOL/L — SIGNIFICANT CHANGE UP (ref 135–145)
SPECIMEN SOURCE: SIGNIFICANT CHANGE UP
TSH SERPL-MCNC: 0.1 UU/ML — LOW (ref 0.36–3.74)
WBC # BLD: 9 K/UL — SIGNIFICANT CHANGE UP (ref 3.8–10.5)
WBC # FLD AUTO: 9 K/UL — SIGNIFICANT CHANGE UP (ref 3.8–10.5)

## 2017-11-23 PROCEDURE — 99233 SBSQ HOSP IP/OBS HIGH 50: CPT

## 2017-11-23 RX ORDER — INSULIN GLARGINE 100 [IU]/ML
30 INJECTION, SOLUTION SUBCUTANEOUS
Qty: 0 | Refills: 0 | Status: DISCONTINUED | OUTPATIENT
Start: 2017-11-23 | End: 2017-11-25

## 2017-11-23 RX ORDER — LACTOBACILLUS ACIDOPHILUS 100MM CELL
1 CAPSULE ORAL EVERY 12 HOURS
Qty: 0 | Refills: 0 | Status: DISCONTINUED | OUTPATIENT
Start: 2017-11-23 | End: 2017-11-25

## 2017-11-23 RX ORDER — DULOXETINE HYDROCHLORIDE 30 MG/1
60 CAPSULE, DELAYED RELEASE ORAL
Qty: 0 | Refills: 0 | COMMUNITY

## 2017-11-23 RX ORDER — WARFARIN SODIUM 2.5 MG/1
2 TABLET ORAL ONCE
Qty: 0 | Refills: 0 | Status: COMPLETED | OUTPATIENT
Start: 2017-11-23 | End: 2017-11-23

## 2017-11-23 RX ORDER — CARVEDILOL PHOSPHATE 80 MG/1
6.25 CAPSULE, EXTENDED RELEASE ORAL EVERY 12 HOURS
Qty: 0 | Refills: 0 | Status: DISCONTINUED | OUTPATIENT
Start: 2017-11-23 | End: 2017-11-25

## 2017-11-23 RX ORDER — AMPICILLIN SODIUM AND SULBACTAM SODIUM 250; 125 MG/ML; MG/ML
3 INJECTION, POWDER, FOR SUSPENSION INTRAMUSCULAR; INTRAVENOUS EVERY 6 HOURS
Qty: 0 | Refills: 0 | Status: DISCONTINUED | OUTPATIENT
Start: 2017-11-23 | End: 2017-11-25

## 2017-11-23 RX ORDER — FENOFIBRATE,MICRONIZED 130 MG
145 CAPSULE ORAL DAILY
Qty: 0 | Refills: 0 | Status: DISCONTINUED | OUTPATIENT
Start: 2017-11-23 | End: 2017-11-25

## 2017-11-23 RX ORDER — AMPICILLIN SODIUM AND SULBACTAM SODIUM 250; 125 MG/ML; MG/ML
3 INJECTION, POWDER, FOR SUSPENSION INTRAMUSCULAR; INTRAVENOUS ONCE
Qty: 0 | Refills: 0 | Status: COMPLETED | OUTPATIENT
Start: 2017-11-23 | End: 2017-11-23

## 2017-11-23 RX ORDER — DULOXETINE HYDROCHLORIDE 30 MG/1
60 CAPSULE, DELAYED RELEASE ORAL DAILY
Qty: 0 | Refills: 0 | Status: DISCONTINUED | OUTPATIENT
Start: 2017-11-23 | End: 2017-11-25

## 2017-11-23 RX ORDER — AMPICILLIN SODIUM AND SULBACTAM SODIUM 250; 125 MG/ML; MG/ML
INJECTION, POWDER, FOR SUSPENSION INTRAMUSCULAR; INTRAVENOUS
Qty: 0 | Refills: 0 | Status: DISCONTINUED | OUTPATIENT
Start: 2017-11-23 | End: 2017-11-25

## 2017-11-23 RX ADMIN — MYCOPHENOLATE MOFETIL 1500 MILLIGRAM(S): 250 CAPSULE ORAL at 18:03

## 2017-11-23 RX ADMIN — WARFARIN SODIUM 2 MILLIGRAM(S): 2.5 TABLET ORAL at 22:25

## 2017-11-23 RX ADMIN — AMPICILLIN SODIUM AND SULBACTAM SODIUM 200 GRAM(S): 250; 125 INJECTION, POWDER, FOR SUSPENSION INTRAMUSCULAR; INTRAVENOUS at 23:53

## 2017-11-23 RX ADMIN — Medication 150 MICROGRAM(S): at 06:32

## 2017-11-23 RX ADMIN — SODIUM CHLORIDE 80 MILLILITER(S): 9 INJECTION INTRAMUSCULAR; INTRAVENOUS; SUBCUTANEOUS at 23:53

## 2017-11-23 RX ADMIN — MYCOPHENOLATE MOFETIL 1500 MILLIGRAM(S): 250 CAPSULE ORAL at 06:32

## 2017-11-23 RX ADMIN — PIPERACILLIN AND TAZOBACTAM 25 GRAM(S): 4; .5 INJECTION, POWDER, LYOPHILIZED, FOR SOLUTION INTRAVENOUS at 06:32

## 2017-11-23 RX ADMIN — CARVEDILOL PHOSPHATE 12.5 MILLIGRAM(S): 80 CAPSULE, EXTENDED RELEASE ORAL at 06:33

## 2017-11-23 RX ADMIN — Medication 75 MILLIGRAM(S): at 11:27

## 2017-11-23 RX ADMIN — FAMOTIDINE 20 MILLIGRAM(S): 10 INJECTION INTRAVENOUS at 11:34

## 2017-11-23 RX ADMIN — AZITHROMYCIN 255 MILLIGRAM(S): 500 TABLET, FILM COATED ORAL at 22:25

## 2017-11-23 RX ADMIN — CARVEDILOL PHOSPHATE 12.5 MILLIGRAM(S): 80 CAPSULE, EXTENDED RELEASE ORAL at 18:04

## 2017-11-23 RX ADMIN — Medication 1: at 11:32

## 2017-11-23 RX ADMIN — AMPICILLIN SODIUM AND SULBACTAM SODIUM 200 GRAM(S): 250; 125 INJECTION, POWDER, FOR SUSPENSION INTRAMUSCULAR; INTRAVENOUS at 09:10

## 2017-11-23 RX ADMIN — AMPICILLIN SODIUM AND SULBACTAM SODIUM 200 GRAM(S): 250; 125 INJECTION, POWDER, FOR SUSPENSION INTRAMUSCULAR; INTRAVENOUS at 13:13

## 2017-11-23 RX ADMIN — AMPICILLIN SODIUM AND SULBACTAM SODIUM 200 GRAM(S): 250; 125 INJECTION, POWDER, FOR SUSPENSION INTRAMUSCULAR; INTRAVENOUS at 18:02

## 2017-11-23 RX ADMIN — Medication 75 MILLIGRAM(S): at 18:03

## 2017-11-23 RX ADMIN — Medication 81 MILLIGRAM(S): at 11:34

## 2017-11-23 NOTE — PATIENT PROFILE ADULT. - VISION (WITH CORRECTIVE LENSES IF THE PATIENT USUALLY WEARS THEM):
use glasses/Partially impaired: cannot see medication labels or newsprint, but can see obstacles in path, and the surrounding layout; can count fingers at arm's length

## 2017-11-23 NOTE — PROGRESS NOTE ADULT - PROBLEM SELECTOR PLAN 1
-  follow culture and cbc in am  -  Continue Unasyn/Zithromax  -  titrate 02 to maintain saturation > or = 92%  -  ID reccs appreciated

## 2017-11-23 NOTE — CONSULT NOTE ADULT - SUBJECTIVE AND OBJECTIVE BOX
Full note to follow  2-3 days of feeling unwell with increased cough, fevers & chills. Slid off the bed and could not arise. BIBA.   CAP  Has history of dysphagia for 2-3 years as well, aspiration in DDx  Influenza/viral process in DDx  Azithromycin can precipitate myasthenic crisis and would avoid it here unless patient diagnosed with Legionella. Unfortunately all agents with atypical coverage have this potential.  Not hospitalized of late and no recent antibiotic use, therefore Zosyn overly broad  Does not seem ill enough to have influenza pneumonia, will not give empiric antivirals presently  Await RVP; precautions per protocol  Cont Azithromycin for now, monitor for myasthenic crisis  Stop Zosyn  Unasyn 3q6  F/U Cx  Legionella Ag  Mycoplasma & Chlamydophila serology  CXR will need to be followed to resolution; long distant smoking history  Thank you for the courtesy of this referral.  Julio Magaña MD  [phone]  ------------------------------  [facility], Division of Infectious Diseases  EL Nguyen A. Lee GUARINO, RENETTA  72y, Male  [id number]    HPI--  *** insert HPI ***    PMH/PSH--  Urge incontinence  Antiphospholipid antibody syndrome  DM (diabetes mellitus)  Hypothyroid  HLD (hyperlipidemia)  HTN (hypertension)  Depression  Anxiety  CVA (cerebral vascular accident)  Hard of hearing  Kidney stones  Myasthenia gravis  Obesity (BMI 30.0-34.9)  Cataract  S/P carpal tunnel release  History of kidney stones  S/P trigger finger release  S/P cervical discectomy  S/P thyroidectomy  S/P cholecystectomy      Allergies--      Medications--  Antibiotics: azithromycin  IVPB 500 milliGRAM(s) IV Intermittent every 24 hours  azithromycin  IVPB      piperacillin/tazobactam IVPB. 3.375 Gram(s) IV Intermittent every 8 hours    Immunologic: mycophenolate mofetil 1500 milliGRAM(s) Oral two times a day    Other: aspirin enteric coated  carvedilol  dextrose 5%.  dextrose 50% Injectable  dextrose 50% Injectable  dextrose 50% Injectable  dextrose Gel PRN  famotidine    Tablet  glucagon  Injectable PRN  insulin glargine Injectable (LANTUS)  insulin lispro (HumaLOG) corrective regimen sliding scale  levothyroxine  sodium chloride 0.9%.  venlafaxine      Social History--  EtOH: denies ***  Tobacco: denies ***  Drug Use: denies ***    Family/Marital History--  No pertinent family history in first degree relatives    Remainder not relevant to clinical concern.    Travel/Environmental/Occupational History:  *** insert T/E/O Hx ***    Review of Systems:  A >=10-point review of systems was obtained.     Pertinent positives and negatives--  Constitutional: No fevers. No Chills. No Rigors.   Eyes:  ENMT:  Cardiovascular: No chest pain. No palpitations.  Respiratory: No shortness of breath. No cough.  Gastrointestinal: No nausea or vomiting. No diarrhea or constipation.   Genitourinary:  Musculoskeletal:  Skin:  Neurologic:  Psychiatric: Pleasant. Appropriate affect.  Endocrine:  Heme/Lymphatic:  Allergy/Immunologic:    Review of systems otherwise negative except as previously noted.    Physical Exam--  Vital Signs: T(F): 98.8 (11-23-17 @ 05:03), Max: 102.5 (11-22-17 @ 17:56)  HR: 86 (11-23-17 @ 05:03)  BP: 135/64 (11-23-17 @ 05:03)  RR: 16 (11-23-17 @ 05:03)  SpO2: 98% (11-23-17 @ 05:03)  Wt(kg): --  General: Nontoxic-appearing Male in no acute distress.  HEENT: AT/NC. PERRL. EOMI. Anicteric. Conjunctiva pink and moist. Oropharynx clear. Dentition fair.  Neck: Not rigid. No sense of mass.  Nodes: None palpable.  Lungs: Clear bilaterally without rales, wheezing or rhonchi  Heart: Regular rate and rhythm. No Murmur. No rub. No gallop. No palpable thrill.  Abdomen: Bowel sounds present and normoactive. Soft. Nondistended. Nontender. No sense of mass. No organomegaly.  Back: No spinal tenderness. No costovertebral angle tenderness.   Extremities: No cyanosis or clubbing. No edema.   Skin: Warm. Dry. Good turgor. No rash. No vasculitic stigmata.  Psychiatric: Appropriate affect and mood for situation.         Laboratory & Imaging Data--  CBC                        11.3   9.0   )-----------( 266      ( 23 Nov 2017 07:20 )             34.7       Chemistries  11-23    141  |  107  |  23  ----------------------------<  116<H>  4.2   |  27  |  1.53<H>    Ca    9.2      23 Nov 2017 07:20    TPro  7.5  /  Alb  3.5  /  TBili  0.4  /  DBili  x   /  AST  17  /  ALT  21  /  AlkPhos  80  11-22    CXR (personally reviewed) < from: Xray Chest 1 View AP/PA. (11.22.17 @ 19:48) >  EXAM:  CHEST SINGLE VIEW                        PROCEDURE DATE:  11/22/2017    INTERPRETATION:  History: Cough  Portable AP radiograph of the chest is performed. comparison is made to   December 7, 2016.  The cardiomediastinal silhouette is normal. the trachea is midline. There   is diffuse interstitial prominence which may be due to infectious   inflammatory condition versus vascular congestion. There is no pleural   effusion. The osseous structures are unremarkable.  Impression: Diffuse interstitial prominence may be infectious or   inflammatory in etiology and appears new from the previous examination  LIZETH QUIROZ M.D.,ATTENDING RADIOLOGIST  This document has been electronically signed. Nov 22 2017  8:03PM   < end of copied text >      Culture Data  None as of yet        Assessment--      Suggestions--        Julio Magaña MD  [phone] Full note to follow  2-3 days of feeling unwell with increased cough, fevers & chills. Slid off the bed and could not arise. BIBA.   CAP  Has history of dysphagia for 2-3 years as well, aspiration in DDx  Influenza/viral process in DDx  Azithromycin can precipitate myasthenic crisis and would avoid it here unless patient diagnosed with Legionella. Unfortunately all agents with atypical coverage have this potential.  Not hospitalized of late and no recent antibiotic use, therefore Zosyn overly broad  Does not seem ill enough to have influenza pneumonia, will not give empiric antivirals presently  Await RVP; precautions per protocol  Cont Azithromycin for now, monitor for myasthenic crisis  Stop Zosyn  Unasyn 3q6  F/U Cx  Legionella Ag  Mycoplasma & Chlamydophila serology  CXR will need to be followed to resolution; long distant smoking history  Thank you for the courtesy of this referral.  Julio Magaña MD  [phone]  ------------------------------  [facility], Division of Infectious Diseases  EL Nguyen A. Lee GUARINO, RENETTA  72y, Male  [phone]    HPI--  72M with history of multiple medical issues including myasthenia gravis well controlled on mycophenolate, APLAS with CVA, DM2, who had been feeling unwell for 2-3 days PTA with gernealized weakness, chills, fevers, and largely nonproductive cough. No SOB or hemoptysis. Wife has been sick with "a cold" but no other sick contacts. Patient has a gel mattress at home, and slid off of it onto the floor. The space between the bed and the wall is cramped and he could not extricate himself. His wilfe called the ambulance and he was brought to the hospital. Here he was found to have bilteral interstitial infiltrates started on antibiotics and admitted. He states he has occasional episodes of dysphagia but can recall none lately. Denies odynophagia. No other complaints presently.    PMH/PSH--  Urge incontinence  Antiphospholipid antibody syndrome  DM (diabetes mellitus)  Hypothyroid  HLD (hyperlipidemia)  HTN (hypertension)  Depression  Anxiety  CVA (cerebral vascular accident)  Hard of hearing  Kidney stones  Myasthenia gravis  Obesity (BMI 30.0-34.9)  Cataract  S/P carpal tunnel release  History of kidney stones  S/P trigger finger release  S/P cervical discectomy  S/P thyroidectomy  S/P cholecystectomy      Allergies-- denies.       Medications--  Antibiotics: azithromycin  IVPB 500 milliGRAM(s) IV Intermittent every 24 hours  azithromycin  IVPB      piperacillin/tazobactam IVPB. 3.375 Gram(s) IV Intermittent every 8 hours    Immunologic: mycophenolate mofetil 1500 milliGRAM(s) Oral two times a day    Other: aspirin enteric coated  carvedilol  dextrose 5%.  dextrose 50% Injectable  dextrose 50% Injectable  dextrose 50% Injectable  dextrose Gel PRN  famotidine    Tablet  glucagon  Injectable PRN  insulin glargine Injectable (LANTUS)  insulin lispro (HumaLOG) corrective regimen sliding scale  levothyroxine  sodium chloride 0.9%.  venlafaxine      Social History--  EtOH: denies  Tobacco: denies presenely, quit many years ago  Drug Use: denies    Family/Marital History--  As above  No pertinent family history in first degree relatives  Remainder not relevant to clinical concern.    Review of Systems:  A >=10-point review of systems was obtained.     Pertinent positives and negatives--  Constitutional: +fevers. +Chills. No Rigors.   Eyes: denies.   ENMT: [place]  Cardiovascular: No chest pain. No palpitations.  Respiratory: No shortness of breath. +cough.  Gastrointestinal: No nausea or vomiting. No diarrhea or constipation.   Genitourinary: incontinence  Musculoskeletal: denies.   Skin: denies.   Neurologic: as above  Psychiatric: Pleasant. Appropriate affect.  Review of systems otherwise negative except as previously noted.    Physical Exam--  Vital Signs: T(F): 98.8 (11-23-17 @ 05:03), Max: 102.5 (11-22-17 @ 17:56)  HR: 86 (11-23-17 @ 05:03)  BP: 135/64 (11-23-17 @ 05:03)  RR: 16 (11-23-17 @ 05:03)  SpO2: 98% (11-23-17 @ 05:03)  Wt(kg): --  General: Nontoxic-appearing Male in no acute distress.  HEENT: AT/NC. PERRL. EOMI. Anicteric. Conjunctiva pink and moist. Oropharynx clear. Dentition fair.  Neck: Not rigid. No sense of mass.  Nodes: None palpable.  Lungs: diminished breath sounds bilaterally without rales, wheezing or rhonchi  Heart: Regular rate and rhythm. No Murmur. No rub. No gallop. No palpable thrill.  Abdomen: Bowel sounds present and normoactive. Soft. Nondistended. Nontender. No sense of mass. No organomegaly.  Back: No spinal tenderness. No costovertebral angle tenderness.   Extremities: No cyanosis or clubbing. No edema.   Skin: Warm. Dry. Good turgor. No rash. No vasculitic stigmata.  Psychiatric: Appropriate affect and mood for situation.         Laboratory & Imaging Data--  CBC                        11.3   9.0   )-----------( 266      ( 23 Nov 2017 07:20 )             34.7       Chemistries  11-23    141  |  107  |  23  ----------------------------<  116<H>  4.2   |  27  |  1.53<H>    Ca    9.2      23 Nov 2017 07:20    TPro  7.5  /  Alb  3.5  /  TBili  0.4  /  DBili  x   /  AST  17  /  ALT  21  /  AlkPhos  80  11-22    CXR (personally reviewed) < from: Xray Chest 1 View AP/PA. (11.22.17 @ 19:48) >  EXAM:  CHEST SINGLE VIEW                        PROCEDURE DATE:  11/22/2017    INTERPRETATION:  History: Cough  Portable AP radiograph of the chest is performed. comparison is made to   December 7, 2016.  The cardiomediastinal silhouette is normal. the trachea is midline. There   is diffuse interstitial prominence which may be due to infectious   inflammatory condition versus vascular congestion. There is no pleural   effusion. The osseous structures are unremarkable.  Impression: Diffuse interstitial prominence may be infectious or   inflammatory in etiology and appears new from the previous examination  LIZETH QUIROZ M.D.,ATTENDING RADIOLOGIST  This document has been electronically signed. Nov 22 2017  8:03PM   < end of copied text >      Culture Data  None as of yet        Assessment--  CAP.  Aspiration in DDx given dysphagia hx/M. gravis  Viral in DDx given sick contact. RVP pending. Seasonal influenza not here yet, as such I do not feel obligated to start Tamiflu  Physical exam not very impressive  Atypica pneumonias possible, unfortunately all the agents usually used for these infections can theoretically precipitate myasthenic crisis. Levofloxacin worse than the others. Azithro ok for now, will need to be monitored closely.  No risk factors for resistant armani elicited, current coverage overly broad      Suggestions--  Precautions per protocol pending RVP  Stop Zosyn  [id number]  Cont Azithro for now, monitor for myasthenic crisis  Mycoplasma serology  Chlamydophila serology  Lergionella Ag  F/U Cx data  CXR will be need to be followed to resolution of changes given prior smoking history    Thank you for the courtesy of this referral.     Julio Magaña MD  [phone]

## 2017-11-23 NOTE — PROGRESS NOTE ADULT - ASSESSMENT
73 y/o male with PMHx of 73 y/o male with PMH myasthenia gravis, DM2, HLD, HTN, CVA, Antiphospholipid antibody syndrome on coumadin,  presents after fall from bed at home shortly before presentation  found to have community acquired pneumonia

## 2017-11-24 LAB
GLUCOSE BLDC GLUCOMTR-MCNC: 68 MG/DL — LOW (ref 70–99)
INR BLD: 2.41 RATIO — HIGH (ref 0.88–1.16)
PROTHROM AB SERPL-ACNC: 26.8 SEC — HIGH (ref 9.8–12.7)

## 2017-11-24 PROCEDURE — 99233 SBSQ HOSP IP/OBS HIGH 50: CPT

## 2017-11-24 RX ORDER — ACETAMINOPHEN 500 MG
650 TABLET ORAL ONCE
Qty: 0 | Refills: 0 | Status: COMPLETED | OUTPATIENT
Start: 2017-11-24 | End: 2017-11-24

## 2017-11-24 RX ADMIN — Medication 145 MILLIGRAM(S): at 11:42

## 2017-11-24 RX ADMIN — DULOXETINE HYDROCHLORIDE 60 MILLIGRAM(S): 30 CAPSULE, DELAYED RELEASE ORAL at 11:42

## 2017-11-24 RX ADMIN — INSULIN GLARGINE 30 UNIT(S): 100 INJECTION, SOLUTION SUBCUTANEOUS at 21:15

## 2017-11-24 RX ADMIN — Medication 81 MILLIGRAM(S): at 11:42

## 2017-11-24 RX ADMIN — Medication 1 TABLET(S): at 05:31

## 2017-11-24 RX ADMIN — INSULIN GLARGINE 30 UNIT(S): 100 INJECTION, SOLUTION SUBCUTANEOUS at 09:43

## 2017-11-24 RX ADMIN — CARVEDILOL PHOSPHATE 6.25 MILLIGRAM(S): 80 CAPSULE, EXTENDED RELEASE ORAL at 05:31

## 2017-11-24 RX ADMIN — AZITHROMYCIN 255 MILLIGRAM(S): 500 TABLET, FILM COATED ORAL at 21:14

## 2017-11-24 RX ADMIN — AMPICILLIN SODIUM AND SULBACTAM SODIUM 200 GRAM(S): 250; 125 INJECTION, POWDER, FOR SUSPENSION INTRAMUSCULAR; INTRAVENOUS at 05:30

## 2017-11-24 RX ADMIN — AMPICILLIN SODIUM AND SULBACTAM SODIUM 200 GRAM(S): 250; 125 INJECTION, POWDER, FOR SUSPENSION INTRAMUSCULAR; INTRAVENOUS at 11:42

## 2017-11-24 RX ADMIN — SODIUM CHLORIDE 80 MILLILITER(S): 9 INJECTION INTRAMUSCULAR; INTRAVENOUS; SUBCUTANEOUS at 17:11

## 2017-11-24 RX ADMIN — Medication 150 MICROGRAM(S): at 05:31

## 2017-11-24 RX ADMIN — MYCOPHENOLATE MOFETIL 1500 MILLIGRAM(S): 250 CAPSULE ORAL at 05:31

## 2017-11-24 RX ADMIN — FAMOTIDINE 20 MILLIGRAM(S): 10 INJECTION INTRAVENOUS at 11:42

## 2017-11-24 RX ADMIN — Medication 650 MILLIGRAM(S): at 05:30

## 2017-11-24 RX ADMIN — Medication 75 MILLIGRAM(S): at 17:12

## 2017-11-24 RX ADMIN — MYCOPHENOLATE MOFETIL 1500 MILLIGRAM(S): 250 CAPSULE ORAL at 17:12

## 2017-11-24 RX ADMIN — AMPICILLIN SODIUM AND SULBACTAM SODIUM 200 GRAM(S): 250; 125 INJECTION, POWDER, FOR SUSPENSION INTRAMUSCULAR; INTRAVENOUS at 17:11

## 2017-11-24 RX ADMIN — Medication 1 TABLET(S): at 17:12

## 2017-11-24 RX ADMIN — CARVEDILOL PHOSPHATE 6.25 MILLIGRAM(S): 80 CAPSULE, EXTENDED RELEASE ORAL at 17:13

## 2017-11-24 RX ADMIN — Medication 75 MILLIGRAM(S): at 09:43

## 2017-11-24 NOTE — PROGRESS NOTE ADULT - ASSESSMENT
72M with DM, htn, + tobacco, admitted with Viral URI rvp + coronavirus  with abnormal cxr-- pneumonia

## 2017-11-24 NOTE — PROGRESS NOTE ADULT - ASSESSMENT
Reason for Call:  Form, our goal is to have forms completed with 72 hours, however, some forms may require a visit or additional information.    Type of letter, form or note:  worker's compensation    Who is the form from?: work  (if other please explain)    Where did the form come from: form was faxed in    What clinic location was the form placed at?: [facility]    Where the form was placed: unknown    What number is listed as a contact on the form?: [phone]       Additional comments: please complete workability form and fax back ASAP. Need to know more specific restrictions.  Form will be faxed to ortho today     Call taken on 11/21/2017 at 10:20 AM by Charlette Romero          73 y/o male with PMHx of 73 y/o male with PMH myasthenia gravis, DM2, HLD, HTN, CVA, Antiphospholipid antibody syndrome on coumadin,  presents after fall from bed at home shortly before presentation  found to have community acquired pneumonia        Problem/Plan - 1:  ·  Problem: Viral Pneumonia caused by Coronavirus with likely superimposed bacterial infecton  .  Plan: -  blood cultures wnl, wbc wnl, pending urine legionella  -  Continue Unasyn/Zithromax  -  off 02 may d/c cardiac monitor  -  ID reccs appreciated.     Problem/Plan - 2:  ·  Problem: Myasthenia gravis.  Plan: -  continue cellcept  -  continue current medication.     Problem/Plan - 3:  ·  Problem: Antiphospholipid antibody syndrome.  Plan: - daily INR  -  daily coumadin dosing will give 2mg x1 today.     Problem/Plan - 4:  ·  Problem: Type 2 diabetes mellitus without complication, with long-term current use of insulin.  Plan: HISS  Continuing lantus  continuing to titrate insulin.     Problem/Plan - 5:  ·  Problem: Essential hypertension.  Plan: continue carvedilol.     Problem/Plan - 6:  Problem: Depression. Plan: continue effexor.    Problem/Plan - 7:  ·  Problem: Acquired hypothyroidism.  Plan: low TSH   will decrease dose of levothyroxine to 125mcg daily/.

## 2017-11-25 ENCOUNTER — TRANSCRIPTION ENCOUNTER (OUTPATIENT)
Age: 72
End: 2017-11-25

## 2017-11-25 VITALS
RESPIRATION RATE: 18 BRPM | SYSTOLIC BLOOD PRESSURE: 144 MMHG | DIASTOLIC BLOOD PRESSURE: 64 MMHG | OXYGEN SATURATION: 96 % | TEMPERATURE: 99 F | WEIGHT: 218.92 LBS | HEART RATE: 80 BPM

## 2017-11-25 PROCEDURE — 99239 HOSP IP/OBS DSCHRG MGMT >30: CPT

## 2017-11-25 RX ORDER — RANITIDINE HYDROCHLORIDE 150 MG/1
1 TABLET, FILM COATED ORAL
Qty: 0 | Refills: 0 | COMMUNITY

## 2017-11-25 RX ORDER — AZITHROMYCIN 500 MG/1
1 TABLET, FILM COATED ORAL
Qty: 2 | Refills: 0 | OUTPATIENT
Start: 2017-11-25 | End: 2017-11-27

## 2017-11-25 RX ORDER — CEFPODOXIME PROXETIL 100 MG
1 TABLET ORAL
Qty: 8 | Refills: 0 | OUTPATIENT
Start: 2017-11-25 | End: 2017-11-29

## 2017-11-25 RX ADMIN — SODIUM CHLORIDE 80 MILLILITER(S): 9 INJECTION INTRAMUSCULAR; INTRAVENOUS; SUBCUTANEOUS at 05:53

## 2017-11-25 RX ADMIN — Medication 150 MICROGRAM(S): at 05:54

## 2017-11-25 RX ADMIN — CARVEDILOL PHOSPHATE 6.25 MILLIGRAM(S): 80 CAPSULE, EXTENDED RELEASE ORAL at 05:54

## 2017-11-25 RX ADMIN — Medication 1 TABLET(S): at 05:54

## 2017-11-25 RX ADMIN — Medication 75 MILLIGRAM(S): at 08:33

## 2017-11-25 RX ADMIN — INSULIN GLARGINE 30 UNIT(S): 100 INJECTION, SOLUTION SUBCUTANEOUS at 08:59

## 2017-11-25 RX ADMIN — AMPICILLIN SODIUM AND SULBACTAM SODIUM 200 GRAM(S): 250; 125 INJECTION, POWDER, FOR SUSPENSION INTRAMUSCULAR; INTRAVENOUS at 00:04

## 2017-11-25 RX ADMIN — AMPICILLIN SODIUM AND SULBACTAM SODIUM 200 GRAM(S): 250; 125 INJECTION, POWDER, FOR SUSPENSION INTRAMUSCULAR; INTRAVENOUS at 05:53

## 2017-11-25 RX ADMIN — MYCOPHENOLATE MOFETIL 1500 MILLIGRAM(S): 250 CAPSULE ORAL at 05:54

## 2017-11-25 NOTE — PROGRESS NOTE ADULT - SUBJECTIVE AND OBJECTIVE BOX
INTERVAL HPI/OVERNIGHT EVENTS:  Uneventful.  Subjective Complaints:  None.   Neurologically stable.    RECENT RADIOLOGY & ADDITIONAL TESTS:    NEUROLOGICAL EXAM (Pertinent):  Vital Signs Last 24 Hrs  T(C): 38.4 (2017 04:55), Max: 38.4 (2017 04:55)  T(F): 101.1 (2017 04:55), Max: 101.1 (2017 04:55)  HR: 85 (2017 04:55) (85 - 92)  BP: 131/73 (2017 04:55) (131/59 - 136/61)  BP(mean): --  RR: 18 (2017 04:55) (17 - 18)  SpO2: 93% (2017 04:55) (93% - 97%)    MEDICATIONS  (STANDING):  ampicillin/sulbactam  IVPB 3 Gram(s) IV Intermittent every 6 hours  ampicillin/sulbactam  IVPB      aspirin enteric coated 81 milliGRAM(s) Oral daily  azithromycin  IVPB 500 milliGRAM(s) IV Intermittent every 24 hours  azithromycin  IVPB      carvedilol 6.25 milliGRAM(s) Oral every 12 hours  dextrose 5%. 1000 milliLiter(s) (50 mL/Hr) IV Continuous <Continuous>  dextrose 50% Injectable 12.5 Gram(s) IV Push once  dextrose 50% Injectable 25 Gram(s) IV Push once  dextrose 50% Injectable 25 Gram(s) IV Push once  DULoxetine 60 milliGRAM(s) Oral daily  famotidine    Tablet 20 milliGRAM(s) Oral daily  fenofibrate Tablet 145 milliGRAM(s) Oral daily  insulin glargine Injectable (LANTUS) 30 Unit(s) SubCutaneous two times a day  insulin lispro (HumaLOG) corrective regimen sliding scale   SubCutaneous three times a day before meals  lactobacillus acidophilus 1 Tablet(s) Oral every 12 hours  levothyroxine 150 MICROGram(s) Oral daily  mycophenolate mofetil 1500 milliGRAM(s) Oral two times a day  sodium chloride 0.9%. 1000 milliLiter(s) (80 mL/Hr) IV Continuous <Continuous>  venlafaxine 75 milliGRAM(s) Oral two times a day with meals    MEDICATIONS  (PRN):  dextrose Gel 1 Dose(s) Oral once PRN Blood Glucose LESS THAN 70 milliGRAM(s)/deciliter  glucagon  Injectable 1 milliGRAM(s) IntraMuscular once PRN Glucose LESS THAN 70 milligrams/deciliter    LABS:                        11.3   9.0   )-----------( 266      ( 2017 07:20 )             34.7         141  |  107  |  23  ----------------------------<  116<H>  4.2   |  27  |  1.53<H>    Ca    9.2      2017 07:20    TPro  7.5  /  Alb  3.5  /  TBili  0.4  /  DBili  x   /  AST  17  /  ALT  21  /  AlkPhos  80      PT/INR - ( 2017 07:20 )   PT: 33.7 sec;   INR: 3.02 ratio         PTT - ( 2017 07:20 )  PTT:43.8 sec  Urinalysis Basic - ( 2017 21:39 )    Color: Yellow / Appearance: Clear / S.015 / pH: x  Gluc: x / Ketone: Negative  / Bili: Negative / Urobili: Negative mg/dL   Blood: x / Protein: 30 mg/dL / Nitrite: Negative   Leuk Esterase: Negative / RBC: 0-2 /HPF / WBC 0-2   Sq Epi: x / Non Sq Epi: Occasional / Bacteria: Few    Assessment & Opinion:  Stable Myasthenia Gravis of many years on Cellcept.  History of fall.  Non-focal neurologic examination.  Recommendations:   Echocardiogram.    DVT prophylaxis as ordered.  Medications:  Continue all medications.  Continue Cellcept.  Neurologically stable for discharge.
[facility], Division of Infectious Diseases  EL Nguyen A. Lee  [phone]    Name: RENETTA PARK  Age: 72y  Gender: Male  MRN: [id number]    Interval History--  Notes reviewed  cough with yellowish phlegm    Past Medical History--  Urge incontinence  Antiphospholipid antibody syndrome  DM (diabetes mellitus)  Hypothyroid  HLD (hyperlipidemia)  HTN (hypertension)  Depression  Anxiety  CVA (cerebral vascular accident)  Hard of hearing  Kidney stones  Myasthenia gravis  Obesity (BMI 30.0-34.9)  Cataract  S/P carpal tunnel release  History of kidney stones  S/P trigger finger release  S/P cervical discectomy  S/P thyroidectomy  S/P cholecystectomy      For details regarding the patient's social history, family history, and other miscellaneous elements, please refer the initial infectious diseases consultation and/or the admitting history and physical examination for this admission.    Allergies    fish (Other)  iodine (Other)  IV Contrast (Urticaria; Hives)  shellfish (Other)  sulfa drugs (Other)    Intolerances        Medications--  Antibiotics:  ampicillin/sulbactam  IVPB 3 Gram(s) IV Intermittent every 6 hours  ampicillin/sulbactam  IVPB      azithromycin  IVPB 500 milliGRAM(s) IV Intermittent every 24 hours  azithromycin  IVPB        Immunologic:  mycophenolate mofetil 1500 milliGRAM(s) Oral two times a day    Other:  aspirin enteric coated  carvedilol  dextrose 5%.  dextrose 50% Injectable  dextrose 50% Injectable  dextrose 50% Injectable  dextrose Gel PRN  DULoxetine  famotidine    Tablet  fenofibrate Tablet  glucagon  Injectable PRN  insulin glargine Injectable (LANTUS)  insulin lispro (HumaLOG) corrective regimen sliding scale  lactobacillus acidophilus  levothyroxine  sodium chloride 0.9%.  venlafaxine      Review of Systems--  A 10-point review of systems was obtained.     Pertinent positives and negatives--  Constitutional: No fevers. No Chills. No Rigors.   Cardiovascular: No chest pain. No palpitations.  Respiratory: No shortness of breath. ++ cough.  Gastrointestinal: No nausea or vomiting. No diarrhea or constipation.   Psychiatric: Pleasant. Appropriate affect.    Review of systems otherwise negative except as previously noted.    Physical Examination--  Vital Signs: T(F): 101.1 (11-24-17 @ 04:55), Max: 101.1 (11-24-17 @ 04:55)  HR: 85 (11-24-17 @ 04:55)  BP: 131/73 (11-24-17 @ 04:55)  RR: 18 (11-24-17 @ 04:55)  SpO2: 93% (11-24-17 @ 04:55)  Wt(kg): --  General: Nontoxic-appearing Male in no acute distress.  HEENT: AT/NC. Anicteric. Conjunctiva pink and moist. Oropharynx clear. Dentition fair.  Neck: Not rigid. No sense of mass.  Nodes: None palpable.  Lungs: decreased bs  Heart: Regular rate and rhythm. No Murmur. No rub. No gallop. No palpable thrill.  Abdomen: Bowel sounds present and normoactive. Soft. Nondistended. Nontender.  Back: No spinal tenderness. No costovertebral angle tenderness.   Extremities: No cyanosis or clubbing. No edema.   Skin: Warm. Dry. Good turgor. No rash. No vasculitic stigmata.  Psychiatric: Appropriate affect and mood for situation.         Laboratory Studies--  CBC                        11.3   9.0   )-----------( 266      ( 23 Nov 2017 07:20 )             34.7       Chemistries  11-23    141  |  107  |  23  ----------------------------<  116<H>  4.2   |  27  |  1.53<H>    Ca    9.2      23 Nov 2017 07:20    TPro  7.5  /  Alb  3.5  /  TBili  0.4  /  DBili  x   /  AST  17  /  ALT  21  /  AlkPhos  80  11-22      Culture Data  Culture - Blood (11.23.17 @ 00:37)    Specimen Source: .Blood Blood-Venous    Culture Results:   No growth to date.      Rapid Respiratory Viral Panel (11.23.17 @ 12:24)    Rapid RVP Result: Detected: This Respiratory Panel uses polymerase chain reaction (PCR) to detect for  adenovirus; coronavirus (HKU1, NL63, 229E, OC43); human metapneumovirus  (hMPV); human enterovirus/rhinovirus (Entero/RV); influenza A; influenza  A/H1; influenza A/H3; influenza A/H1-2009; influenza B; parainfluenza  viruses 1, 2, 3, 4; respiratory syncytial virus; Mycoplasma pneumoniae;  and Chlamydophila pneumoniae.
INTERVAL HPI/OVERNIGHT EVENTS:  Subjective Complaints:  No complaints.  AAOX3.  Stable MG on Cellcept.  Moves all extremities.  Speech intact.  On Aspirin.  On antibiotics  (see chest X-rays) with Diffuse interstitial prominence may be infectious or inflammatory in etiology and appears new from the previous examination    NEUROLOGICAL EXAM (Pertinent):  Vital Signs Last 24 Hrs  T(C): 37.1 (2017 11:23), Max: 39.2 (2017 17:56)  T(F): 98.8 (2017 11:23), Max: 102.5 (2017 17:56)  HR: 87 (2017 11:23) (86 - 106)  BP: 118/61 (2017 11:23) (110/58 - 146/63)  BP(mean): --  RR: 18 (2017 11:23) (16 - 18)  SpO2: 98% (2017 11:23) (96% - 99%)    MEDICATIONS  (STANDING):  ampicillin/sulbactam  IVPB 3 Gram(s) IV Intermittent every 6 hours  ampicillin/sulbactam  IVPB      aspirin enteric coated 81 milliGRAM(s) Oral daily  azithromycin  IVPB 500 milliGRAM(s) IV Intermittent every 24 hours  azithromycin  IVPB      carvedilol 12.5 milliGRAM(s) Oral every 12 hours  dextrose 5%. 1000 milliLiter(s) (50 mL/Hr) IV Continuous <Continuous>  dextrose 50% Injectable 12.5 Gram(s) IV Push once  dextrose 50% Injectable 25 Gram(s) IV Push once  dextrose 50% Injectable 25 Gram(s) IV Push once  famotidine    Tablet 20 milliGRAM(s) Oral daily  insulin glargine Injectable (LANTUS) 10 Unit(s) SubCutaneous at bedtime  insulin lispro (HumaLOG) corrective regimen sliding scale   SubCutaneous three times a day before meals  levothyroxine 150 MICROGram(s) Oral daily  mycophenolate mofetil 1500 milliGRAM(s) Oral two times a day  sodium chloride 0.9%. 1000 milliLiter(s) (80 mL/Hr) IV Continuous <Continuous>  venlafaxine 75 milliGRAM(s) Oral two times a day with meals    MEDICATIONS  (PRN):  dextrose Gel 1 Dose(s) Oral once PRN Blood Glucose LESS THAN 70 milliGRAM(s)/deciliter  glucagon  Injectable 1 milliGRAM(s) IntraMuscular once PRN Glucose LESS THAN 70 milligrams/deciliter    LABS:                        11.3   9.0   )-----------( 266      ( 2017 07:20 )             34.7         141  |  107  |  23  ----------------------------<  116<H>  4.2   |  27  |  1.53<H>    Ca    9.2      2017 07:20    TPro  7.5  /  Alb  3.5  /  TBili  0.4  /  DBili  x   /  AST  17  /  ALT  21  /  AlkPhos  80      PT/INR - ( 2017 07:20 )   PT: 33.7 sec;   INR: 3.02 ratio         PTT - ( 2017 07:20 )  PTT:43.8 sec  Urinalysis Basic - ( 2017 21:39 )    Color: Yellow / Appearance: Clear / S.015 / pH: x  Gluc: x / Ketone: Negative  / Bili: Negative / Urobili: Negative mg/dL   Blood: x / Protein: 30 mg/dL / Nitrite: Negative   Leuk Esterase: Negative / RBC: 0-2 /HPF / WBC 0-2   Sq Epi: x / Non Sq Epi: Occasional / Bacteria: Few    Assessment & Opinion:  Stable Myasthenia Gravis of many years on Cellcept.  History of fall.  Non-focal neurologic examination.  Recommendations:   Echocardiogram.    DVT prophylaxis as ordered.  Medications:  Continue all medications.  Continue Cellcept
INTERVAL HPI/OVERNIGHT EVENTS:  Subjective Complaints:  none, except not able to sleep for a couple of nights    NEUROLOGICAL EXAM (Pertinent):  Vital Signs Last 24 Hrs  T(C): 37.4 (25 Nov 2017 05:05), Max: 37.7 (25 Nov 2017 00:00)  T(F): 99.4 (25 Nov 2017 05:05), Max: 99.9 (25 Nov 2017 00:00)  HR: 80 (25 Nov 2017 05:05) (79 - 84)  BP: 144/64 (25 Nov 2017 05:05) (138/69 - 150/68)  BP(mean): --  RR: 18 (25 Nov 2017 05:05) (17 - 18)  SpO2: 96% (25 Nov 2017 05:05) (93% - 98%)    MEDICATIONS  (STANDING):  ampicillin/sulbactam  IVPB 3 Gram(s) IV Intermittent every 6 hours  ampicillin/sulbactam  IVPB      aspirin enteric coated 81 milliGRAM(s) Oral daily  azithromycin  IVPB 500 milliGRAM(s) IV Intermittent every 24 hours  azithromycin  IVPB      carvedilol 6.25 milliGRAM(s) Oral every 12 hours  dextrose 5%. 1000 milliLiter(s) (50 mL/Hr) IV Continuous <Continuous>  dextrose 50% Injectable 12.5 Gram(s) IV Push once  dextrose 50% Injectable 25 Gram(s) IV Push once  dextrose 50% Injectable 25 Gram(s) IV Push once  DULoxetine 60 milliGRAM(s) Oral daily  famotidine    Tablet 20 milliGRAM(s) Oral daily  fenofibrate Tablet 145 milliGRAM(s) Oral daily  insulin glargine Injectable (LANTUS) 30 Unit(s) SubCutaneous two times a day  insulin lispro (HumaLOG) corrective regimen sliding scale   SubCutaneous three times a day before meals  lactobacillus acidophilus 1 Tablet(s) Oral every 12 hours  levothyroxine 150 MICROGram(s) Oral daily  mycophenolate mofetil 1500 milliGRAM(s) Oral two times a day  sodium chloride 0.9%. 1000 milliLiter(s) (80 mL/Hr) IV Continuous <Continuous>  venlafaxine 75 milliGRAM(s) Oral two times a day with meals    MEDICATIONS  (PRN):  dextrose Gel 1 Dose(s) Oral once PRN Blood Glucose LESS THAN 70 milliGRAM(s)/deciliter  glucagon  Injectable 1 milliGRAM(s) IntraMuscular once PRN Glucose LESS THAN 70 milligrams/deciliter    Assessment & Opinion:  Stable Myasthenia Gravis.  History of fall.  Non-focal neurologic examination.  Medications:  Continue all medications.  Continue Cellcept.  Neurologically stable for discharge.
Patient is a 72y old  Male who presents with a chief complaint of s/p fall at home, feeling unsteady (2017 01:26)        HPI:  73 y/o male with PMH myasthenia gravis, DM2, HLD, HTN, CVA, Antiphospholipid antibody syndrome on coumadin,  presents after fall from bed at home shortly before presentation. Patient states that he did not pass out, he slipped out of bed, denies head trauma, denies LOC, neck pain, chest pain, shortness of breath, abdominal pain, syncope. Patient received assistance from EMS and was told he had fever. Pt has been coughing past few days, non productive, with subjective fever, no SOB, wheezing, dysphagia, N/V, dysuria or rash.  Pt had flu shot this year and pneumococcal vaccine about a year ago; he denies sick contacts or recent travel. (2017 21:01)      SUBJECTIVE & OBJECTIVE: Pt seen and examined at bedside had an episode of fever to 101F last night. Afebrile at present.  Not requiring 02, state that he is bringing up more sputum.  Denies other complaint.      PHYSICAL EXAM:  T(C): 37.3 (17 @ 12:50), Max: 38.4 (17 @ 04:55)  HR: 79 (-- @ 12:50) (79 - 92)  BP: 138/69 (--17 @ 12:50) (131/59 - 138/69)  RR: 18 (-17 @ 12:50) (17 - 18)  SpO2: 93% (17 @ 12:50) (93% - 97%)      GENERAL: NAD, well-groomed, well-developed  HEAD:  Atraumatic, Normocephalic  EYES: EOMI, PERRLA, conjunctiva and sclera clear  ENMT: Moist mucous membranes  NECK: Supple, No JVD  NERVOUS SYSTEM:  Alert & Oriented X3, Motor Strength 5/5 B/L upper and lower extremities; DTRs 2+ intact and symmetric  CHEST/LUNG: rhonchorus breath sounds, no wheezing, poor air entry to bilateral bases  HEART: Regular rate and rhythm; No murmurs, rubs, or gallops  ABDOMEN: Soft, Nontender, Nondistended; Bowel sounds present  EXTREMITIES:  2+ Peripheral Pulses, No clubbing, cyanosis, or edema        MEDICATIONS  (STANDING):  ampicillin/sulbactam  IVPB 3 Gram(s) IV Intermittent every 6 hours  ampicillin/sulbactam  IVPB      aspirin enteric coated 81 milliGRAM(s) Oral daily  azithromycin  IVPB 500 milliGRAM(s) IV Intermittent every 24 hours  azithromycin  IVPB      carvedilol 6.25 milliGRAM(s) Oral every 12 hours  dextrose 5%. 1000 milliLiter(s) (50 mL/Hr) IV Continuous <Continuous>  dextrose 50% Injectable 12.5 Gram(s) IV Push once  dextrose 50% Injectable 25 Gram(s) IV Push once  dextrose 50% Injectable 25 Gram(s) IV Push once  DULoxetine 60 milliGRAM(s) Oral daily  famotidine    Tablet 20 milliGRAM(s) Oral daily  fenofibrate Tablet 145 milliGRAM(s) Oral daily  insulin glargine Injectable (LANTUS) 30 Unit(s) SubCutaneous two times a day  insulin lispro (HumaLOG) corrective regimen sliding scale   SubCutaneous three times a day before meals  lactobacillus acidophilus 1 Tablet(s) Oral every 12 hours  levothyroxine 150 MICROGram(s) Oral daily  mycophenolate mofetil 1500 milliGRAM(s) Oral two times a day  sodium chloride 0.9%. 1000 milliLiter(s) (80 mL/Hr) IV Continuous <Continuous>  venlafaxine 75 milliGRAM(s) Oral two times a day with meals    MEDICATIONS  (PRN):  dextrose Gel 1 Dose(s) Oral once PRN Blood Glucose LESS THAN 70 milliGRAM(s)/deciliter  glucagon  Injectable 1 milliGRAM(s) IntraMuscular once PRN Glucose LESS THAN 70 milligrams/deciliter      LABS:                        11.3   9.0   )-----------( 266      ( 2017 07:20 )             34.7     11-23    141  |  107  |  23  ----------------------------<  116<H>  4.2   |  27  |  1.53<H>    Ca    9.2      2017 07:20    TPro  7.5  /  Alb  3.5  /  TBili  0.4  /  DBili  x   /  AST  17  /  ALT  21  /  AlkPhos  80  11-22    PT/INR - ( 2017 07:20 )   PT: 33.7 sec;   INR: 3.02 ratio         PTT - ( 2017 07:20 )  PTT:43.8 sec  Urinalysis Basic - ( 2017 21:39 )    Color: Yellow / Appearance: Clear / S.015 / pH: x  Gluc: x / Ketone: Negative  / Bili: Negative / Urobili: Negative mg/dL   Blood: x / Protein: 30 mg/dL / Nitrite: Negative   Leuk Esterase: Negative / RBC: 0-2 /HPF / WBC 0-2   Sq Epi: x / Non Sq Epi: Occasional / Bacteria: Few    POCT Blood Glucose.: 120 mg/dL (2017 11:45)  POCT Blood Glucose.: 80 mg/dL (2017 08:54)  POCT Blood Glucose.: 68 mg/dL (2017 02:58)  POCT Blood Glucose.: 83 mg/dL (2017 22:24)  POCT Blood Glucose.: 102 mg/dL (2017 16:56)    RECENT CULTURES:  Culture - Blood (17 @ 00:37)    Specimen Source: .Blood Blood-Venous    Culture Results:   No growth to date.  Culture - Blood (17 @ 00:37)    Specimen Source: .Blood Blood-Venous    Culture Results:   No growth to date.  Culture - Urine (17 @ 00:29)    Specimen Source: .Urine Clean Catch (Midstream)    Culture Results:   No growth  Rapid Respiratory Viral Panel (17 @ 12:24)    Rapid RVP Result: Detected: This Respiratory Panel uses polymerase chain reaction (PCR) to detect for  adenovirus; coronavirus (HKU1, NL63, 229E, OC43); human metapneumovirus  (hMPV); human enterovirus/rhinovirus (Entero/RV); influenza A; influenza  A/H1; influenza A/H3; influenza A/H1-2009; influenza B; parainfluenza  viruses 1, 2, 3, 4; respiratory syncytial virus; Mycoplasma pneumoniae;  and Chlamydophila pneumoniae.    RADIOLOGY & ADDITIONAL TESTS:  < from: Xray Chest 1 View AP/PA. (17 @ 19:48) >  Impression: Diffuse interstitial prominence may be infectious or   inflammatory in etiology and appears new from the previous examination    < end of copied text >      DVT/GI ppx  Discussed with pt @ bedside
Patient is a 72y old  Male who presents with a chief complaint of s/p fall at home, feeling unsteady (2017 01:26)    HPI:  73 y/o male with PMH myasthenia gravis, DM2, HLD, HTN, CVA, Antiphospholipid antibody syndrome on coumadin,  presents after fall from bed at home shortly before presentation. Patient states that he did not pass out, he slipped out of bed, denies head trauma, denies LOC, neck pain, chest pain, shortness of breath, abdominal pain, syncope. Patient received assistance from EMS and was told he had fever. Pt has been coughing past few days, non productive, with subjective fever, no SOB, wheezing, dysphagia, N/V, dysuria or rash.  Pt had flu shot this year and pneumococcal vaccine about a year ago; he denies sick contacts or recent travel. (2017 21:01)      SUBJECTIVE & OBJECTIVE: Pt seen and examined at bedside still has persistent cough.  Denies shortness of breath, denies headache, denies fevers.  States that he is feeling much better today.     PHYSICAL EXAM:  T(C): 37.1 (17 @ 11:23), Max: 39.2 (17 @ 17:56)  HR: 87 (17 @ 11:23) (86 - 106)  BP: 118/61 (17 @ 11:23) (110/58 - 146/63)  RR: 18 (17 @ 11:23) (16 - 18)  SpO2: 98% (17 @ 11:23) (96% - 99%)  Wt(kg): -- Height (cm): 175.26 ( @ :56)  Weight (kg): 99.1 ( @ :24)  BMI (kg/m2): 32.3 (:24)  BSA (m2): 2.14 (:24)    GENERAL: NAD, well-groomed, well-developed  HEAD:  Atraumatic, Normocephalic  EYES: EOMI, PERRLA, conjunctiva and sclera clear  ENMT: Moist mucous membranes  NECK: Supple, No JVD  NERVOUS SYSTEM:  Alert & Oriented X3, Motor Strength 5/5 B/L upper and lower extremities; DTRs 2+ intact and symmetric  CHEST/LUNG: rhonchi to the left lower lung base. no wheezing  HEART: Regular rate and rhythm; No murmurs, rubs, or gallops  ABDOMEN: Soft, Nontender, Nondistended; Bowel sounds present  EXTREMITIES:  2+ Peripheral Pulses, No clubbing, cyanosis, or edema      MEDICATIONS  (STANDING):  ampicillin/sulbactam  IVPB 3 Gram(s) IV Intermittent every 6 hours  ampicillin/sulbactam  IVPB      aspirin enteric coated 81 milliGRAM(s) Oral daily  azithromycin  IVPB 500 milliGRAM(s) IV Intermittent every 24 hours  azithromycin  IVPB      carvedilol 12.5 milliGRAM(s) Oral every 12 hours  dextrose 5%. 1000 milliLiter(s) (50 mL/Hr) IV Continuous <Continuous>  dextrose 50% Injectable 12.5 Gram(s) IV Push once  dextrose 50% Injectable 25 Gram(s) IV Push once  dextrose 50% Injectable 25 Gram(s) IV Push once  famotidine    Tablet 20 milliGRAM(s) Oral daily  insulin glargine Injectable (LANTUS) 10 Unit(s) SubCutaneous at bedtime  insulin lispro (HumaLOG) corrective regimen sliding scale   SubCutaneous three times a day before meals  levothyroxine 150 MICROGram(s) Oral daily  mycophenolate mofetil 1500 milliGRAM(s) Oral two times a day  sodium chloride 0.9%. 1000 milliLiter(s) (80 mL/Hr) IV Continuous <Continuous>  venlafaxine 75 milliGRAM(s) Oral two times a day with meals    MEDICATIONS  (PRN):  dextrose Gel 1 Dose(s) Oral once PRN Blood Glucose LESS THAN 70 milliGRAM(s)/deciliter  glucagon  Injectable 1 milliGRAM(s) IntraMuscular once PRN Glucose LESS THAN 70 milligrams/deciliter      LABS:                        11.3   9.0   )-----------( 266      ( 2017 07:20 )             34.7     -    141  |  107  |  23  ----------------------------<  116<H>  4.2   |  27  |  1.53<H>    Ca    9.2      2017 07:20  TPro  7.5  /  Alb  3.5  /  TBili  0.4  /  DBili  x   /  AST  17  /  ALT  21  /  AlkPhos  80  11-22  PT/INR - ( 2017 07:20 )   PT: 33.7 sec;   INR: 3.02 ratio    PTT - ( 2017 07:20 )  PTT:43.8 sec  Urinalysis Basic - ( 2017 21:39 )  Color: Yellow / Appearance: Clear / S.015 / pH: x  Gluc: x / Ketone: Negative  / Bili: Negative / Urobili: Negative mg/dL   Blood: x / Protein: 30 mg/dL / Nitrite: Negative   Leuk Esterase: Negative / RBC: 0-2 /HPF / WBC 0-2   Sq Epi: x / Non Sq Epi: Occasional / Bacteria: Few    POCT Blood Glucose.: 188 mg/dL (2017 11:29)  POCT Blood Glucose.: 111 mg/dL (2017 08:02)  POCT Blood Glucose.: 143 mg/dL (2017 22:54)  POCT Blood Glucose.: 199 mg/dL (2017 21:27)    RECENT CULTURES:  pending blood culture    RADIOLOGY & ADDITIONAL TESTS:  < from: Xray Chest 1 View AP/PA. (17 @ 19:48) >  Impression: Diffuse interstitial prominence may be infectious or   inflammatory in etiology and appears new from the previous examination    < end of copied text >  < from: CT 3D Reconstruct w/ Workstation (17 @ 22:04) >  Impression:     Head CT: No acute intracranial hemorrhage or displaced skull fracture. If   clinically indicated, short-term follow-up orMRI may be obtained for   further evaluation.    Cervical spine CT: Post surgical and degenerative changes as described   Diffuse osteopenia without obvious fracture or traumatic malalignment in   the cervical spine. If clinically indicated, MRI may be obtained for   further evaluation.    < end of copied text >                        DVT/GI ppx  Discussed with pt @ bedside

## 2017-11-25 NOTE — DISCHARGE NOTE ADULT - HOSPITAL COURSE
Patient is a 72y old  Male who presents with a chief complaint of s/p fall at home, feeling unsteady (23 Nov 2017 01:26)    HPI:  73 y/o male with PMH myasthenia gravis, DM2, HLD, HTN, CVA, Antiphospholipid antibody syndrome on coumadin,  presents after fall from bed at home shortly before presentation. Patient states that he did not pass out, he slipped out of bed, denies head trauma, denies LOC, neck pain, chest pain, shortness of breath, abdominal pain, syncope. Patient received assistance from EMS and was told he had fever. Pt has been coughing past few days, non productive, with subjective fever, no SOB, wheezing, dysphagia, N/V, dysuria or rash.  Pt had flu shot this year and pneumococcal vaccine about a year ago; he denies sick contacts or recent travel. (22 Nov 2017 21:01)    Hospital course: Patient had RVP completed on day 0 of his hospital stay with finding significant for Coronavirus infection.  He had persistent fevers on day 0 and day 1 with convalescence of same on day 3.  Blood and urine cultures are negative and he is doing well at time of discharge.  He will follow up with PMD in one week.  Is discharged home on Vantin to complete 7 days of abx and Zithromax to complete 5 days of treatment.   PHYSICAL EXAM:  T(C): 37.4 (11-25-17 @ 05:05), Max: 37.7 (11-25-17 @ 00:00)  HR: 80 (11-25-17 @ 05:05) (79 - 84)  BP: 144/64 (11-25-17 @ 05:05) (138/69 - 150/68)  RR: 18 (11-25-17 @ 05:05) (17 - 18)  SpO2: 96% (11-25-17 @ 05:05) (93% - 98%)    GENERAL: NAD, well-groomed, well-developed  HEAD:  Atraumatic, Normocephalic  EYES: EOMI, PERRLA, conjunctiva and sclera clear  ENMT: Moist mucous membranes  NECK: Supple, No JVD  NERVOUS SYSTEM:  Alert & Oriented X3, Motor Strength 5/5 B/L upper and lower extremities; DTRs 2+ intact and symmetric  CHEST/LUNG: good breath sounds to bases, small rhonci right lower base  HEART: Regular rate and rhythm; No murmurs, rubs, or gallops  ABDOMEN: Soft, Nontender, Nondistended; Bowel sounds present  EXTREMITIES:  2+ Peripheral Pulses, No clubbing, cyanosis, or edema    MEDICATIONS  (STANDING):  ampicillin/sulbactam  IVPB 3 Gram(s) IV Intermittent every 6 hours  aspirin enteric coated 81 milliGRAM(s) Oral daily  azithromycin  IVPB 500 milliGRAM(s) IV Intermittent every 24 hours  carvedilol 6.25 milliGRAM(s) Oral every 12 hours  DULoxetine 60 milliGRAM(s) Oral daily  famotidine    Tablet 20 milliGRAM(s) Oral daily  fenofibrate Tablet 145 milliGRAM(s) Oral daily  insulin glargine Injectable (LANTUS) 30 Unit(s) SubCutaneous two times a day  insulin lispro (HumaLOG) corrective regimen sliding scale   SubCutaneous three times a day before meals  lactobacillus acidophilus 1 Tablet(s) Oral every 12 hours  levothyroxine 150 MICROGram(s) Oral daily  mycophenolate mofetil 1500 milliGRAM(s) Oral two times a day  sodium chloride 0.9%. 1000 milliLiter(s) (80 mL/Hr) IV Continuous <Continuous>  venlafaxine 75 milliGRAM(s) Oral two times a day with meals    PT/INR - ( 24 Nov 2017 15:14 )   PT: 26.8 sec;   INR: 2.41 ratio    POCT Blood Glucose.: 82 mg/dL (25 Nov 2017 08:32)  POCT Blood Glucose.: 149 mg/dL (24 Nov 2017 21:12)  POCT Blood Glucose.: 140 mg/dL (24 Nov 2017 17:08)  POCT Blood Glucose.: 120 mg/dL (24 Nov 2017 11:45)    RECENT CULTURES:  Culture - Blood (11.23.17 @ 00:37)    Specimen Source: .Blood Blood-Venous    Culture Results:   No growth to date.  Culture - Blood (11.23.17 @ 00:37)    Specimen Source: .Blood Blood-Venous    Culture Results:   No growth to date.  Culture - Urine (11.23.17 @ 00:29)    Specimen Source: .Urine Clean Catch (Midstream)    Culture Results:   No growth  Rapid Respiratory Viral Panel (11.23.17 @ 12:24)    Rapid RVP Result: Detected: This Respiratory Panel uses polymerase chain reaction (PCR) to detect for  adenovirus; coronavirus (HKU1, NL63, 229E, OC43); human metapneumovirus  (hMPV); human enterovirus/rhinovirus (Entero/RV); influenza A; influenza  A/H1; influenza A/H3; influenza A/H1-2009; influenza B; parainfluenza  viruses 1, 2, 3, 4; respiratory syncytial virus; Mycoplasma pneumoniae;  and Chlamydophila pneumoniae.    RADIOLOGY & ADDITIONAL TESTS:  < from: Xray Chest 1 View AP/PA. (11.22.17 @ 19:48) >  Impression: Diffuse interstitial prominence may be infectious or   inflammatory in etiology and appears new from the previous examination    < end of copied text >

## 2017-11-25 NOTE — DISCHARGE NOTE ADULT - PLAN OF CARE
improvement in symptoms 1.  Take all medications as prescribed  2.  Follow up with PMD in 1 week  3.  Activity as tolerated. improvement of symptoms Continue to take all medications as prescribed

## 2017-11-25 NOTE — DISCHARGE NOTE ADULT - PATIENT PORTAL LINK FT
“You can access the Follow[facility] Patient Portal, offered by [facility], by registering with the following website: [email or web address]”

## 2017-11-25 NOTE — DISCHARGE NOTE ADULT - CARE PROVIDER_API CALL
Stu Loya), Internal Medicine  [address] [place] [address]  Phone: [phone]  Fax: [phone]

## 2017-11-25 NOTE — DISCHARGE NOTE ADULT - SECONDARY DIAGNOSIS.
Community acquired pneumonia due to group B Streptococcus Antiphospholipid antibody syndrome Depression Type 2 diabetes mellitus without complication, with long-term current use of insulin Essential hypertension

## 2017-11-25 NOTE — DISCHARGE NOTE ADULT - MEDICATION SUMMARY - MEDICATIONS TO TAKE
I will START or STAY ON the medications listed below when I get home from the hospital:    aspirin 81 mg oral delayed release tablet  -- 1 tab(s) by mouth once a day  -- Indication: For Essential hypertension    Coumadin 2 mg oral tablet  -- 1 tab(s) by mouth once a day (at bedtime)  -- Do not take this drug if you are pregnant.  It is very important that you take or use this exactly as directed.  Do not skip doses or discontinue unless directed by your doctor.  Obtain medical advice before taking any non-prescription drugs as some may affect the action of this medication.    -- Indication: For Antiphospholipid antibody syndrome    Cymbalta  -- 60 milligram(s) by mouth once a day  -- Indication: For Depression    venlafaxine 75 mg oral tablet  -- 1 tab(s) by mouth 2 times a day (with meals)  -- Indication: For Depression    insulin glargine  -- 15 unit(s) subcutaneous once a day (at bedtime)  -- Indication: For Diabetes    fenofibrate 145 mg oral tablet  -- 1 tab(s) by mouth once a day  -- Indication: For Hyperlipidemia    carvedilol 6.25 mg oral tablet  -- 1 tab(s) by mouth every 12 hours  -- Indication: For Essential hypertension    cefpodoxime 200 mg oral tablet  -- 1 tab(s) by mouth 2 times a day   -- Finish all this medication unless otherwise directed by prescriber.  Take with food or milk.    -- Indication: For Community Acquired Pneumia    pyridostigmine 60 mg oral tablet  -- 1 tab(s) by mouth 3 times a day  -- Indication: For Myasthenia gravis    famotidine 20 mg oral tablet  -- 1 tab(s) by mouth once a day  -- Indication: For Gastritis    mycophenolate mofetil 250 mg oral capsule  -- 1500 milligram(s) by mouth 2 times a day  -- Indication: For Myasthenia gravis    Zithromax 250 mg oral tablet  -- 1 tab(s) by mouth once a day   -- Do not take dairy products, antacids, or iron preparations within one hour of this medication.  Finish all this medication unless otherwise directed by prescriber.    -- Indication: For Community Acquired Pneumonia    lactobacillus acidophilus oral capsule  -- 1 dose(s) by mouth 2 times a day x 2 weeks  -- Indication: For Probiotic    levothyroxine 150 mcg (0.15 mg) oral tablet  -- 1 tab(s) by mouth once a day  -- Indication: For Hypothyroidism

## 2017-11-25 NOTE — DISCHARGE NOTE ADULT - CARE PLAN
Principal Discharge DX:	Coronavirus infection  Goal:	improvement in symptoms  Instructions for follow-up, activity and diet:	1.  Take all medications as prescribed  2.  Follow up with PMD in 1 week  3.  Activity as tolerated.  Secondary Diagnosis:	Community acquired pneumonia due to group B Streptococcus  Goal:	improvement of symptoms  Instructions for follow-up, activity and diet:	1.  Take all medications as prescribed  2.  Follow up with PMD in 1 week  3.  Activity as tolerated.  Secondary Diagnosis:	Antiphospholipid antibody syndrome  Instructions for follow-up, activity and diet:	Continue to take all medications as prescribed  Secondary Diagnosis:	Depression  Instructions for follow-up, activity and diet:	Continue to take all medications as prescribed  Secondary Diagnosis:	Type 2 diabetes mellitus without complication, with long-term current use of insulin  Instructions for follow-up, activity and diet:	Continue to take all medications as prescribed  Secondary Diagnosis:	Essential hypertension  Instructions for follow-up, activity and diet:	Continue to take all medications as prescribed

## 2017-11-26 LAB
M PNEUMO IGG SER IA-ACNC: 2.05 INDEX — HIGH
M PNEUMO IGG SER IA-ACNC: POSITIVE
M PNEUMO IGM SER-ACNC: 63 UNITS/ML — SIGNIFICANT CHANGE UP
MYCOPLASMA AG SPEC QL: NEGATIVE — SIGNIFICANT CHANGE UP

## 2017-11-27 LAB
C PNEUM IGM TITR SER: SIGNIFICANT CHANGE UP TITER
CHLAMYDIA IGG SER-ACNC: SIGNIFICANT CHANGE UP TITER
CHLAMYDIA PNEUMONIAE IGA: SIGNIFICANT CHANGE UP TITER

## 2017-11-28 LAB
CULTURE RESULTS: SIGNIFICANT CHANGE UP
CULTURE RESULTS: SIGNIFICANT CHANGE UP
SPECIMEN SOURCE: SIGNIFICANT CHANGE UP
SPECIMEN SOURCE: SIGNIFICANT CHANGE UP

## 2017-11-29 LAB
CULTURE RESULTS: SIGNIFICANT CHANGE UP
SPECIMEN SOURCE: SIGNIFICANT CHANGE UP

## 2017-12-01 DIAGNOSIS — Z98.42 CATARACT EXTRACTION STATUS, LEFT EYE: ICD-10-CM

## 2017-12-01 DIAGNOSIS — J12.89 OTHER VIRAL PNEUMONIA: ICD-10-CM

## 2017-12-01 DIAGNOSIS — Z79.01 LONG TERM (CURRENT) USE OF ANTICOAGULANTS: ICD-10-CM

## 2017-12-01 DIAGNOSIS — Z98.41 CATARACT EXTRACTION STATUS, RIGHT EYE: ICD-10-CM

## 2017-12-01 DIAGNOSIS — G70.00 MYASTHENIA GRAVIS WITHOUT (ACUTE) EXACERBATION: ICD-10-CM

## 2017-12-01 DIAGNOSIS — E66.9 OBESITY, UNSPECIFIED: ICD-10-CM

## 2017-12-01 DIAGNOSIS — D68.61 ANTIPHOSPHOLIPID SYNDROME: ICD-10-CM

## 2017-12-01 DIAGNOSIS — H91.90 UNSPECIFIED HEARING LOSS, UNSPECIFIED EAR: ICD-10-CM

## 2017-12-01 DIAGNOSIS — I10 ESSENTIAL (PRIMARY) HYPERTENSION: ICD-10-CM

## 2017-12-01 DIAGNOSIS — J18.9 PNEUMONIA, UNSPECIFIED ORGANISM: ICD-10-CM

## 2017-12-01 DIAGNOSIS — J15.3 PNEUMONIA DUE TO STREPTOCOCCUS, GROUP B: ICD-10-CM

## 2017-12-01 DIAGNOSIS — E11.9 TYPE 2 DIABETES MELLITUS WITHOUT COMPLICATIONS: ICD-10-CM

## 2017-12-01 DIAGNOSIS — E78.5 HYPERLIPIDEMIA, UNSPECIFIED: ICD-10-CM

## 2017-12-01 DIAGNOSIS — B34.2 CORONAVIRUS INFECTION, UNSPECIFIED: ICD-10-CM

## 2017-12-01 DIAGNOSIS — Z86.73 PERSONAL HISTORY OF TRANSIENT ISCHEMIC ATTACK (TIA), AND CEREBRAL INFARCTION WITHOUT RESIDUAL DEFICITS: ICD-10-CM

## 2017-12-01 DIAGNOSIS — E89.0 POSTPROCEDURAL HYPOTHYROIDISM: ICD-10-CM

## 2017-12-01 DIAGNOSIS — Z90.49 ACQUIRED ABSENCE OF OTHER SPECIFIED PARTS OF DIGESTIVE TRACT: ICD-10-CM

## 2017-12-01 DIAGNOSIS — Z87.891 PERSONAL HISTORY OF NICOTINE DEPENDENCE: ICD-10-CM

## 2017-12-01 DIAGNOSIS — Z87.442 PERSONAL HISTORY OF URINARY CALCULI: ICD-10-CM

## 2017-12-01 DIAGNOSIS — F32.9 MAJOR DEPRESSIVE DISORDER, SINGLE EPISODE, UNSPECIFIED: ICD-10-CM

## 2017-12-01 DIAGNOSIS — Z88.2 ALLERGY STATUS TO SULFONAMIDES: ICD-10-CM

## 2017-12-01 DIAGNOSIS — Z79.4 LONG TERM (CURRENT) USE OF INSULIN: ICD-10-CM

## 2017-12-01 DIAGNOSIS — F41.9 ANXIETY DISORDER, UNSPECIFIED: ICD-10-CM

## 2017-12-01 DIAGNOSIS — Z91.041 RADIOGRAPHIC DYE ALLERGY STATUS: ICD-10-CM

## 2017-12-01 DIAGNOSIS — Z79.82 LONG TERM (CURRENT) USE OF ASPIRIN: ICD-10-CM

## 2017-12-01 DIAGNOSIS — Z91.013 ALLERGY TO SEAFOOD: ICD-10-CM

## 2018-05-05 ENCOUNTER — INPATIENT (INPATIENT)
Facility: HOSPITAL | Age: 73
LOS: 0 days | DRG: 951 | End: 2018-05-06
Attending: STUDENT IN AN ORGANIZED HEALTH CARE EDUCATION/TRAINING PROGRAM | Admitting: STUDENT IN AN ORGANIZED HEALTH CARE EDUCATION/TRAINING PROGRAM
Payer: MEDICARE

## 2018-05-05 ENCOUNTER — EMERGENCY (EMERGENCY)
Facility: HOSPITAL | Age: 73
LOS: 0 days | Discharge: ANOTHER HOSPITAL | End: 2018-05-05
Attending: EMERGENCY MEDICINE
Payer: MEDICARE

## 2018-05-05 VITALS
DIASTOLIC BLOOD PRESSURE: 94 MMHG | RESPIRATION RATE: 14 BRPM | SYSTOLIC BLOOD PRESSURE: 152 MMHG | OXYGEN SATURATION: 100 % | TEMPERATURE: 100 F | HEART RATE: 114 BPM

## 2018-05-05 VITALS
RESPIRATION RATE: 15 BRPM | HEART RATE: 120 BPM | OXYGEN SATURATION: 100 % | DIASTOLIC BLOOD PRESSURE: 101 MMHG | SYSTOLIC BLOOD PRESSURE: 196 MMHG

## 2018-05-05 VITALS
OXYGEN SATURATION: 100 % | DIASTOLIC BLOOD PRESSURE: 63 MMHG | RESPIRATION RATE: 18 BRPM | SYSTOLIC BLOOD PRESSURE: 113 MMHG | HEART RATE: 91 BPM | TEMPERATURE: 102 F

## 2018-05-05 DIAGNOSIS — Z98.89 OTHER SPECIFIED POSTPROCEDURAL STATES: Chronic | ICD-10-CM

## 2018-05-05 DIAGNOSIS — Z87.442 PERSONAL HISTORY OF URINARY CALCULI: Chronic | ICD-10-CM

## 2018-05-05 DIAGNOSIS — Z91.013 ALLERGY TO SEAFOOD: ICD-10-CM

## 2018-05-05 DIAGNOSIS — E89.0 POSTPROCEDURAL HYPOTHYROIDISM: Chronic | ICD-10-CM

## 2018-05-05 DIAGNOSIS — F32.9 MAJOR DEPRESSIVE DISORDER, SINGLE EPISODE, UNSPECIFIED: ICD-10-CM

## 2018-05-05 DIAGNOSIS — Z79.4 LONG TERM (CURRENT) USE OF INSULIN: ICD-10-CM

## 2018-05-05 DIAGNOSIS — Z90.49 ACQUIRED ABSENCE OF OTHER SPECIFIED PARTS OF DIGESTIVE TRACT: Chronic | ICD-10-CM

## 2018-05-05 DIAGNOSIS — I61.9 NONTRAUMATIC INTRACEREBRAL HEMORRHAGE, UNSPECIFIED: ICD-10-CM

## 2018-05-05 DIAGNOSIS — I10 ESSENTIAL (PRIMARY) HYPERTENSION: ICD-10-CM

## 2018-05-05 DIAGNOSIS — Z91.041 RADIOGRAPHIC DYE ALLERGY STATUS: ICD-10-CM

## 2018-05-05 DIAGNOSIS — Z79.01 LONG TERM (CURRENT) USE OF ANTICOAGULANTS: ICD-10-CM

## 2018-05-05 DIAGNOSIS — E03.9 HYPOTHYROIDISM, UNSPECIFIED: ICD-10-CM

## 2018-05-05 DIAGNOSIS — E11.9 TYPE 2 DIABETES MELLITUS WITHOUT COMPLICATIONS: ICD-10-CM

## 2018-05-05 DIAGNOSIS — G70.00 MYASTHENIA GRAVIS WITHOUT (ACUTE) EXACERBATION: ICD-10-CM

## 2018-05-05 DIAGNOSIS — R63.8 OTHER SYMPTOMS AND SIGNS CONCERNING FOOD AND FLUID INTAKE: ICD-10-CM

## 2018-05-05 DIAGNOSIS — H26.9 UNSPECIFIED CATARACT: Chronic | ICD-10-CM

## 2018-05-05 DIAGNOSIS — I46.9 CARDIAC ARREST, CAUSE UNSPECIFIED: ICD-10-CM

## 2018-05-05 LAB
ALBUMIN SERPL ELPH-MCNC: 3.2 G/DL — LOW (ref 3.3–5)
ALP SERPL-CCNC: 106 U/L — SIGNIFICANT CHANGE UP (ref 40–120)
ALT FLD-CCNC: 18 U/L — SIGNIFICANT CHANGE UP (ref 12–78)
AMMONIA BLD-MCNC: 47 UMOL/L — HIGH (ref 11–32)
ANION GAP SERPL CALC-SCNC: 13 MMOL/L — SIGNIFICANT CHANGE UP (ref 5–17)
APAP SERPL-MCNC: < 2 UG/ML (ref 10–30)
APPEARANCE UR: CLEAR — SIGNIFICANT CHANGE UP
APTT BLD: 48.8 SEC — HIGH (ref 27.5–37.4)
AST SERPL-CCNC: 28 U/L — SIGNIFICANT CHANGE UP (ref 15–37)
BASOPHILS # BLD AUTO: 0 K/UL — SIGNIFICANT CHANGE UP (ref 0–0.2)
BASOPHILS NFR BLD AUTO: 0.1 % — SIGNIFICANT CHANGE UP (ref 0–2)
BILIRUB SERPL-MCNC: 0.6 MG/DL — SIGNIFICANT CHANGE UP (ref 0.2–1.2)
BILIRUB UR-MCNC: NEGATIVE — SIGNIFICANT CHANGE UP
BUN SERPL-MCNC: 18 MG/DL — SIGNIFICANT CHANGE UP (ref 7–23)
CALCIUM SERPL-MCNC: 8.9 MG/DL — SIGNIFICANT CHANGE UP (ref 8.5–10.1)
CHLORIDE SERPL-SCNC: 105 MMOL/L — SIGNIFICANT CHANGE UP (ref 96–108)
CO2 SERPL-SCNC: 24 MMOL/L — SIGNIFICANT CHANGE UP (ref 22–31)
COLOR SPEC: YELLOW — SIGNIFICANT CHANGE UP
CREAT SERPL-MCNC: 1.29 MG/DL — SIGNIFICANT CHANGE UP (ref 0.5–1.3)
DIFF PNL FLD: ABNORMAL
EOSINOPHIL # BLD AUTO: 0.2 K/UL — SIGNIFICANT CHANGE UP (ref 0–0.5)
EOSINOPHIL NFR BLD AUTO: 0.7 % — SIGNIFICANT CHANGE UP (ref 0–6)
ETHANOL SERPL-MCNC: <10 MG/DL — SIGNIFICANT CHANGE UP (ref 0–10)
GLUCOSE BLDC GLUCOMTR-MCNC: 171 MG/DL — HIGH (ref 70–99)
GLUCOSE SERPL-MCNC: 176 MG/DL — HIGH (ref 70–99)
GLUCOSE UR QL: NEGATIVE MG/DL — SIGNIFICANT CHANGE UP
HCT VFR BLD CALC: 37.1 % — LOW (ref 39–50)
HCT VFR BLD CALC: 38 % — LOW (ref 39–50)
HGB BLD-MCNC: 11.6 G/DL — LOW (ref 13–17)
HGB BLD-MCNC: 12.6 G/DL — LOW (ref 13–17)
INR BLD: 1.42 RATIO — HIGH (ref 0.88–1.16)
INR BLD: 3.47 RATIO — HIGH (ref 0.88–1.16)
INR BLD: 3.63 RATIO — HIGH (ref 0.88–1.16)
KETONES UR-MCNC: NEGATIVE — SIGNIFICANT CHANGE UP
LACTATE SERPL-SCNC: 4.9 MMOL/L — CRITICAL HIGH (ref 0.7–2)
LEUKOCYTE ESTERASE UR-ACNC: NEGATIVE — SIGNIFICANT CHANGE UP
LYMPHOCYTES # BLD AUTO: 1.7 K/UL — SIGNIFICANT CHANGE UP (ref 1–3.3)
LYMPHOCYTES # BLD AUTO: 6.8 % — LOW (ref 13–44)
MCHC RBC-ENTMCNC: 26.7 PG — LOW (ref 27–34)
MCHC RBC-ENTMCNC: 28.5 PG — SIGNIFICANT CHANGE UP (ref 27–34)
MCHC RBC-ENTMCNC: 31.3 GM/DL — LOW (ref 32–36)
MCHC RBC-ENTMCNC: 33.2 GM/DL — SIGNIFICANT CHANGE UP (ref 32–36)
MCV RBC AUTO: 85.3 FL — SIGNIFICANT CHANGE UP (ref 80–100)
MCV RBC AUTO: 85.8 FL — SIGNIFICANT CHANGE UP (ref 80–100)
MONOCYTES # BLD AUTO: 1.6 K/UL — HIGH (ref 0–0.9)
MONOCYTES NFR BLD AUTO: 6.7 % — SIGNIFICANT CHANGE UP (ref 2–14)
NEUTROPHILS # BLD AUTO: 21.1 K/UL — HIGH (ref 1.8–7.4)
NEUTROPHILS NFR BLD AUTO: 85.7 % — HIGH (ref 43–77)
NITRITE UR-MCNC: NEGATIVE — SIGNIFICANT CHANGE UP
NRBC # BLD: 0 /100 WBCS — SIGNIFICANT CHANGE UP (ref 0–0)
NT-PROBNP SERPL-SCNC: 221 PG/ML — HIGH (ref 0–125)
PCP SPEC-MCNC: SIGNIFICANT CHANGE UP
PH UR: 6.5 — SIGNIFICANT CHANGE UP (ref 5–8)
PLAT MORPH BLD: NORMAL — SIGNIFICANT CHANGE UP
PLATELET # BLD AUTO: 380 K/UL — SIGNIFICANT CHANGE UP (ref 150–400)
PLATELET # BLD AUTO: 410 K/UL — HIGH (ref 150–400)
POTASSIUM SERPL-MCNC: 4.3 MMOL/L — SIGNIFICANT CHANGE UP (ref 3.5–5.3)
POTASSIUM SERPL-SCNC: 4.3 MMOL/L — SIGNIFICANT CHANGE UP (ref 3.5–5.3)
PROT SERPL-MCNC: 7.3 GM/DL — SIGNIFICANT CHANGE UP (ref 6–8.3)
PROT UR-MCNC: 500 MG/DL
PROTHROM AB SERPL-ACNC: 15.5 SEC — HIGH (ref 9.8–12.7)
PROTHROM AB SERPL-ACNC: 38.4 SEC — HIGH (ref 9.8–12.7)
PROTHROM AB SERPL-ACNC: 40.6 SEC — HIGH (ref 9.8–12.7)
RBC # BLD: 4.35 M/UL — SIGNIFICANT CHANGE UP (ref 4.2–5.8)
RBC # BLD: 4.43 M/UL — SIGNIFICANT CHANGE UP (ref 4.2–5.8)
RBC # FLD: 12.9 % — SIGNIFICANT CHANGE UP (ref 10.3–14.5)
RBC # FLD: 13.9 % — SIGNIFICANT CHANGE UP (ref 10.3–14.5)
RBC BLD AUTO: SIGNIFICANT CHANGE UP
SALICYLATES SERPL-MCNC: <1.7 MG/DL — LOW (ref 2.8–20)
SODIUM SERPL-SCNC: 142 MMOL/L — SIGNIFICANT CHANGE UP (ref 135–145)
SP GR SPEC: 1.01 — SIGNIFICANT CHANGE UP (ref 1.01–1.02)
TROPONIN I SERPL-MCNC: <.015 NG/ML — SIGNIFICANT CHANGE UP (ref 0.01–0.04)
TSH SERPL-MCNC: 0.03 UIU/ML — LOW (ref 0.36–3.74)
UROBILINOGEN FLD QL: NEGATIVE MG/DL — SIGNIFICANT CHANGE UP
WBC # BLD: 18.71 K/UL — HIGH (ref 3.8–10.5)
WBC # BLD: 24.6 K/UL — HIGH (ref 3.8–10.5)
WBC # FLD AUTO: 18.71 K/UL — HIGH (ref 3.8–10.5)
WBC # FLD AUTO: 24.6 K/UL — HIGH (ref 3.8–10.5)

## 2018-05-05 PROCEDURE — 96374 THER/PROPH/DIAG INJ IV PUSH: CPT

## 2018-05-05 PROCEDURE — 94002 VENT MGMT INPAT INIT DAY: CPT

## 2018-05-05 PROCEDURE — 99291 CRITICAL CARE FIRST HOUR: CPT

## 2018-05-05 PROCEDURE — 96375 TX/PRO/DX INJ NEW DRUG ADDON: CPT

## 2018-05-05 PROCEDURE — 93010 ELECTROCARDIOGRAM REPORT: CPT

## 2018-05-05 PROCEDURE — 74176 CT ABD & PELVIS W/O CONTRAST: CPT | Mod: 26

## 2018-05-05 PROCEDURE — 85610 PROTHROMBIN TIME: CPT

## 2018-05-05 PROCEDURE — 85027 COMPLETE CBC AUTOMATED: CPT

## 2018-05-05 PROCEDURE — 70450 CT HEAD/BRAIN W/O DYE: CPT | Mod: 26

## 2018-05-05 PROCEDURE — 99291 CRITICAL CARE FIRST HOUR: CPT | Mod: 25

## 2018-05-05 PROCEDURE — 71045 X-RAY EXAM CHEST 1 VIEW: CPT | Mod: 26,76

## 2018-05-05 PROCEDURE — 99223 1ST HOSP IP/OBS HIGH 75: CPT

## 2018-05-05 RX ORDER — NICARDIPINE HYDROCHLORIDE 30 MG/1
3 CAPSULE, EXTENDED RELEASE ORAL
Qty: 40 | Refills: 0 | Status: DISCONTINUED | OUTPATIENT
Start: 2018-05-05 | End: 2018-05-05

## 2018-05-05 RX ORDER — PROTHROMBIN COMPLEX CONCENTRATE (HUMAN) 25.5; 16.5; 24; 22; 22; 26 [IU]/ML; [IU]/ML; [IU]/ML; [IU]/ML; [IU]/ML; [IU]/ML
1500 POWDER, FOR SOLUTION INTRAVENOUS ONCE
Qty: 0 | Refills: 0 | Status: COMPLETED | OUTPATIENT
Start: 2018-05-05 | End: 2018-05-05

## 2018-05-05 RX ORDER — ROBINUL 0.2 MG/ML
0.4 INJECTION INTRAMUSCULAR; INTRAVENOUS
Qty: 0 | Refills: 0 | Status: DISCONTINUED | OUTPATIENT
Start: 2018-05-05 | End: 2018-05-06

## 2018-05-05 RX ORDER — ETOMIDATE 2 MG/ML
20 INJECTION INTRAVENOUS ONCE
Qty: 0 | Refills: 0 | Status: COMPLETED | OUTPATIENT
Start: 2018-05-05 | End: 2018-05-05

## 2018-05-05 RX ORDER — PHYTONADIONE (VIT K1) 5 MG
10 TABLET ORAL ONCE
Qty: 0 | Refills: 0 | Status: COMPLETED | OUTPATIENT
Start: 2018-05-05 | End: 2018-05-05

## 2018-05-05 RX ORDER — NICARDIPINE HYDROCHLORIDE 30 MG/1
5 CAPSULE, EXTENDED RELEASE ORAL
Qty: 40 | Refills: 0 | Status: DISCONTINUED | OUTPATIENT
Start: 2018-05-05 | End: 2018-05-05

## 2018-05-05 RX ORDER — ACETAMINOPHEN 500 MG
1000 TABLET ORAL ONCE
Qty: 0 | Refills: 0 | Status: COMPLETED | OUTPATIENT
Start: 2018-05-05 | End: 2018-05-05

## 2018-05-05 RX ORDER — HYDROMORPHONE HYDROCHLORIDE 2 MG/ML
0.2 INJECTION INTRAMUSCULAR; INTRAVENOUS; SUBCUTANEOUS
Qty: 0 | Refills: 0 | Status: DISCONTINUED | OUTPATIENT
Start: 2018-05-05 | End: 2018-05-06

## 2018-05-05 RX ORDER — ROCURONIUM BROMIDE 10 MG/ML
50 VIAL (ML) INTRAVENOUS ONCE
Qty: 0 | Refills: 0 | Status: COMPLETED | OUTPATIENT
Start: 2018-05-05 | End: 2018-05-05

## 2018-05-05 RX ORDER — ACETAMINOPHEN 500 MG
650 TABLET ORAL EVERY 6 HOURS
Qty: 0 | Refills: 0 | Status: DISCONTINUED | OUTPATIENT
Start: 2018-05-05 | End: 2018-05-06

## 2018-05-05 RX ORDER — MIDAZOLAM HYDROCHLORIDE 1 MG/ML
6 INJECTION, SOLUTION INTRAMUSCULAR; INTRAVENOUS
Qty: 100 | Refills: 0 | Status: DISCONTINUED | OUTPATIENT
Start: 2018-05-05 | End: 2018-05-05

## 2018-05-05 RX ADMIN — PROTHROMBIN COMPLEX CONCENTRATE (HUMAN) 400 INTERNATIONAL UNIT(S): 25.5; 16.5; 24; 22; 22; 26 POWDER, FOR SOLUTION INTRAVENOUS at 12:50

## 2018-05-05 RX ADMIN — MIDAZOLAM HYDROCHLORIDE 6 MG/HR: 1 INJECTION, SOLUTION INTRAMUSCULAR; INTRAVENOUS at 11:23

## 2018-05-05 RX ADMIN — Medication 400 MILLIGRAM(S): at 12:20

## 2018-05-05 RX ADMIN — ETOMIDATE 20 MILLIGRAM(S): 2 INJECTION INTRAVENOUS at 09:52

## 2018-05-05 RX ADMIN — Medication 50 MILLIGRAM(S): at 09:53

## 2018-05-05 RX ADMIN — NICARDIPINE HYDROCHLORIDE 15 MG/HR: 30 CAPSULE, EXTENDED RELEASE ORAL at 12:28

## 2018-05-05 RX ADMIN — Medication 102 MILLIGRAM(S): at 13:03

## 2018-05-05 NOTE — ED PROVIDER NOTE - NS ED ROS FT
**ATTENDING ADDENDUM (Dr. Rich Baig): Of note, and in addition to the above, following discussion with wife (HCP) and daughter, patient has history of myasthenia gravis and thymectomy. NO prior episode of myasthenia gravis resulting in endotracheal intubation or respiratory failure. Had been maintained on his medications chronically. Patient had expressed advance directives indicating DNR/DNI.

## 2018-05-05 NOTE — ED PROVIDER NOTE - PROGRESS NOTE DETAILS
spoke with neurosx, Dr. Sanchez, Dr. Aleman, ICU, Dr. Baig ER  neuro believes poor prognosis but will accept patient for evaluation and discussion of care with family  starting nicardipine for pressure, sedation with versed now wife called ER, asks us to pursue care at this time, she is aware of the grave prognosis and CT report  will continue with transfer to [place]

## 2018-05-05 NOTE — ED PROVIDER NOTE - ENMT, MLM
Airway patent, Nasal mucosa clear. Mouth with normal mucosa. Trachea is midline. **ATTENDING ADDENDUM (Dr. Rich Baig): POSITIVE endotracheal intubation with mechanical ventilation.

## 2018-05-05 NOTE — H&P ADULT - PROBLEM SELECTOR PLAN 1
- Pt with massive ICH, with uncal herniation.  Etiology unknown, however given pt was on a/c, suspect this is 2/2 to the medication.  GCS of 3  - Given extremely poor prognosis, pt to be admitted to PCU with further management as per palliative team

## 2018-05-05 NOTE — H&P ADULT - NSHPPHYSICALEXAM_GEN_ALL_CORE
VITALS  Vital Signs Last 24 Hrs  T(C): 37.8 (05 May 2018 12:10), Max: 38.7 (05 May 2018 11:32)  T(F): 100 (05 May 2018 12:10), Max: 101.6 (05 May 2018 11:32)  HR: 100 (05 May 2018 13:56) (91 - 122)  BP: 140/90 (05 May 2018 13:52) (113/63 - 196/101)  BP(mean): --  RR: 17 (05 May 2018 13:52) (14 - 18)  SpO2: 100% (05 May 2018 13:56) (100% - 100%)    I&O's Summary      PHYSICAL EXAM  General: A&Ox0, intubated  Head: NC/AT;  Eyes: Pupils fixed  Neck: Supple; no JVD  Respiratory: CTA B/L; no wheezes/crackles/rales   Cardiovascular: Regular rhythm/rate; S1/S2  Gastrointestinal: Soft; NTND w/out rebound tenderness or guarding; bowel sounds normal  Extremities: WWP; no edema or cyanosis  Neurological:  Pt unresponsive, pupils fixed, GCS of 3

## 2018-05-05 NOTE — PATIENT PROFILE ADULT. - BLOOD AVOIDANCE/RESTRICTIONS, PROFILE
Please call [phone] and ask the [facility] to remove Ms. junie Hameed  [facility] #[id number] from their contact records.    She is not appropriate to have colonoscopy for colon cancer screening.  She is very particular about her health and received a letter that it was necessary for her to have a colonoscopy which upset her greatly.   none

## 2018-05-05 NOTE — ED ADULT NURSE REASSESSMENT NOTE - NS ED NURSE REASSESS COMMENT FT1
pharmacy called for patients k-centra and vitamin K
1230- Cardene drip titrated to 8mg/hr as ordered by MD Myers
Neuro at the bedside. family at the bedside.
cardene drip infusing at 3mg/hr as ordered by MD Myers.
paused Cardene as per MD Myers

## 2018-05-05 NOTE — ED PROVIDER NOTE - ATTENDING CONTRIBUTION TO CARE
**ATTENDING ADDENDUM (Dr. Rich Baig): I attest that I have directly examined this patient and reviewed and formulated the diagnostic and therapeutic management plan in collaboration with the EM resident. Please see MDM note and remainder of EMR for findings from CC, HPI, ROS, and PE. (Louis)

## 2018-05-05 NOTE — ED PROVIDER NOTE - MEDICAL DECISION MAKING DETAILS
73 yo M with possible serotonin syndrome vs stroke vs sepsis, doubt myasthenic crisis, hyperthyroid  -sepsis w/u, stroke w/u with CT stat, finger stick 180, intubation stat, alcohl, drug screen ua, cx, girard, blood gas, ammonia, midazolam infusion for sedation, asa and tylenol levels, tsh, ct abd/pel/brain, cxr, ekg  -f/u results, reeval

## 2018-05-05 NOTE — ED PROVIDER NOTE - PLAN OF CARE
ATTENDING ADDENDUM (Dr. Rich Baig): Goals of care include resolution of emergent/urgent symptoms and concerns, and restoration to baseline level of homeostasis.

## 2018-05-05 NOTE — ED PROVIDER NOTE - PROGRESS NOTE DETAILS
**ATTENDING ADDENDUM (Dr. Rich Baig): patient serially evaluated throughout ED course. NO acute deterioration up to this time in the ED. Neurosurgery and social work Sophie present to evaluate patient and discuss case with the family. Patient's HCP is his wife. Both the wife and daughter relate the patient's advance directives: DNR/DNI. Discussion about palliative care provided with daughter and family. Consultations made. Will continue to observe and monitor closely. **ATTENDING ADDENDUM (Dr. Rich Baig): patient serially evaluated throughout ED course. NO acute deterioration up to this time in the ED. Neurosurgery and social work Sophie present to evaluate patient and discuss case with the family. Patient's HCP is his wife. Both the wife and daughter relate the patient's advance directives: DNR/DNI. Discussion about palliative care provided with daughter and family. Consultations made. Will continue to observe and monitor closely. It is likely that patient will be admitted to an intensive care setting (palliative care) without surgical intervention where discussion of withdrawal of care and palliative care options may be discussed in keeping with patient-centered advance directives, as expressed by wife (HCP).

## 2018-05-05 NOTE — ED PROVIDER NOTE - MEDICAL DECISION MAKING DETAILS
Massive intracranial hemorrhage w/ uncal herniation and fixed pupils, pt was on warfarin, will give K centra and Vit K, BP control w/ nicardipine, neurosurg and palliative consultation Massive intracranial hemorrhage w/ uncal herniation and fixed pupils, pt was on warfarin, will give K centra and Vit K, BP control w/ nicardipine, neurosurg and palliative consultation  **ATTENDING MEDICAL DECISION MAKING/SYNTHESIS (Dr. Rich Baig): I have reviewed the Chief Complaint, the HPI, the ROS, and have directly performed and confirmed the findings on the Physical Examination. I have reviewed the medical decision making with all providers, as applicable. The PROBLEM REPRESENTATION at this time is: 72-year-old man with prior history of myasthenia gravis, antiphospholipid antibodies (coagulopathy, on warfarin anticoagulation), with acute mental status change and respiratory distress, presented to S ([facility]), where endotracheal intubation and Head CT revealed massive intracerebral hemorrhage. On infusions (nicardipine, midazolam). The MOST LIKELY DIAGNOSIS, and the LIST OF DIFFERENTIAL DIAGNOSES, includes (but is not limited to) the following: XX.   The likelihood of each of these diagnoses has been appropriately considered in the context of this patient's presentation and my evaluation. PLAN: as described in EMR, including diagnostics, therapeutics and consultation as clinically warranted. I will continue to reevaluate the patient, including the results of all testing, and monitor response to therapy throughout the patient's course in the ED. Massive intracranial hemorrhage w/ uncal herniation and fixed pupils, pt was on warfarin, will give K centra and Vit K, BP control w/ nicardipine, neurosurg and palliative consultation  **ATTENDING MEDICAL DECISION MAKING/SYNTHESIS (Dr. Rich Baig): I have reviewed the Chief Complaint, the HPI, the ROS, and have directly performed and confirmed the findings on the Physical Examination. I have reviewed the medical decision making with all providers, as applicable. The PROBLEM REPRESENTATION at this time is: 72-year-old man with prior history of myasthenia gravis, antiphospholipid antibodies (coagulopathy, on warfarin anticoagulation), with acute mental status change and respiratory distress, presented to [facility] ([facility]), where endotracheal intubation performed for airway protection and Head CT revealed massive intracerebral hemorrhage. On infusions (nicardipine, midazolam). For emergent neurosurgical evaluation. DNR (but intubated prior to arrival to [facility] at [facility]). The MOST LIKELY DIAGNOSIS, and the LIST OF DIFFERENTIAL DIAGNOSES, includes (but is not limited to) the following: aneurysmal v. hypertensive hemorrhagic stroke, ischemic stroke with hemorrhagic conversion, mass/tumor with hemorrhage (unlikely given presentation and clinical findings), terminal event (very likely), serious bacterial infection or sepsis/severe sepsis (NO evidence), sequela of intracerebral hemorrhage e.g. respiratory failure (obvious). The likelihood of each of these diagnoses has been appropriately considered in the context of this patient's presentation and my evaluation. PLAN: as described in EMR, including diagnostics, therapeutics and consultation as clinically warranted. I will continue to reevaluate the patient, including the results of all testing, and monitor response to therapy throughout the patient's course in the ED.

## 2018-05-05 NOTE — ED PROVIDER NOTE - CRITICAL CARE PROVIDED
documentation/direct patient care (not related to procedure)/interpretation of diagnostic studies/consultation with other physicians/additional history taking/conducted a detailed discussion of DNR status/consult w/ pt's family directly relating to pts condition

## 2018-05-05 NOTE — H&P ADULT - NSHPLABSRESULTS_GEN_ALL_CORE
.  LABS:                         12.6   24.6  )-----------( 410      ( 05 May 2018 12:32 )             38.0         142  |  105  |  18  ----------------------------<  176<H>  4.3   |  24  |  1.29    Ca    8.9      05 May 2018 10:14    TPro  7.3  /  Alb  3.2<L>  /  TBili  0.6  /  DBili  x   /  AST  28  /  ALT  18  /  AlkPhos  106      PT/INR - ( 05 May 2018 13:33 )   PT: 15.5 sec;   INR: 1.42 ratio         PTT - ( 05 May 2018 10:14 )  PTT:48.8 sec  Urinalysis Basic - ( 05 May 2018 10:39 )    Color: Yellow / Appearance: Clear / S.015 / pH: x  Gluc: x / Ketone: Negative  / Bili: Negative / Urobili: Negative mg/dL   Blood: x / Protein: 500 mg/dL / Nitrite: Negative   Leuk Esterase: Negative / RBC: 3-5 /HPF / WBC 3-5   Sq Epi: x / Non Sq Epi: Occasional / Bacteria: x      CARDIAC MARKERS ( 05 May 2018 10:14 )  <.015 ng/mL / x     / x     / x     / x          Serum Pro-Brain Natriuretic Peptide: 221 pg/mL ( @ 10:14)    Lactate, Blood: 4.9 mmol/L ( @ 10:14)      < from: CT Brain Stroke Protocol (18 @ 11:10) >    IMPRESSION:    1)  there is a massive left intracerebral hemorrhage. A a large amount of   blood infiltrates throughout the left frontal parietal and temporal   lobes, as well as the deep structures inclusive of basal ganglia and   thalami. Also blood dissects into the ventricular system as well as the   subarachnoid spaces.  2)  there is severe rightward subfalcian and uncal herniation. Underlying   ischemic changes are noted in both hemispheres, as well as within the   brainstem. There is hemorrhage and/or hemorrhagic infarction in the   brainstem as well.

## 2018-05-05 NOTE — H&P ADULT - HISTORY OF PRESENT ILLNESS
History obtained from chart as pt is intubated.  71 yo male with hx of MG, anti-phospholipid syndrome on Coumadin who was found unresponsive by his wife this morning.  As per notes last known normal was last night.  At OSH pt was found to have massive ICH with uncal herniation.  Pt was subsequently transferred to [facility], and the decision was made for patient to be admitted to the PCU for further management.  As per documentation pt is DNR.      In the ED the pt received Vitamin K and Kcentra.

## 2018-05-05 NOTE — ED ADULT NURSE NOTE - OBJECTIVE STATEMENT
Pt found unresponsive by wife last seen well 11:30pm last night . Pt unresponsive stiff position arms at side toes pointed on nonrebreather IV access left hand 18 G by EMS. Pt evaluated by Dr Jefferson upon arrival intubated additional IV access obtained Pt pupils fixed Pt found unresponsive by wife last seen well 11:30pm last night . Pt unresponsive stiff position arms at side toes pointed on nonrebreather IV access left hand 18 G by EMS. Pt evaluated by Dr Jefferson upon arrival intubated additional IV access obtained Pt pupils fixed Wife did not come with pt to the ed to follow. History unknown per chart history Antiphospholipid antibody syndrome, anxiety, CVA,  depression , DM hyperlipidemia, HTN Hypothyroid, kidney stones and Myasthenia gravis

## 2018-05-05 NOTE — ED PROVIDER NOTE - UNABLE TO OBTAIN
Severe Illness/Injury **ATTENDING ADDENDUM (Dr. Rich Baig): Exploration of CC, HPI and review of systems limited by patient's acuity, altered mental status, and endotracheal intubation/mechanical ventilation. **ATTENDING ADDENDUM (Dr. Rich Baig): Exploration of CC, HPI and review of systems limited by patient's acuity.

## 2018-05-05 NOTE — ED ADULT TRIAGE NOTE - SOURCE OF INFORMATION
Ongoing SW/CM Assessment/Plan of Care Note     See SW/CM flowsheets for goals and other objective data.    Patient/Family discharge goal (s):  Goal #1: Communication facilitated  Goal #2: Home Care arranged or issues addressed  Goal #3: Home discharge arranged    PT Recommendation:  Recommendation for Discharge: PT: Home, Home therapy (with assist from family)    OT Recommendation:  Recommendations for Discharge: OT: Sub-acute nursing home (pt has potential to return home pending medical course)    SLP Recommendation:       Disposition:  Home with [facility] at home  Progress note:   Case discussed in OFT's, d/c date has not been determined at this time. Patient continues to be dependent on oxygen which patient did not require prior to admission. OT evaluation completed with new recommendation for [facility] placement. Writer met with patient bedside to discuss d/c plans and new recommendation. Writer explained [facility] placement. Patient refuses subacute placement and is planning to return home with [facility] at home services (PT/OT/RN). Patient expressed she feels comfortable discharging home with her families support. D/C date not determined at this time and SW will remain involved for d/c plans and needs.          EMS

## 2018-05-05 NOTE — ED PROVIDER NOTE - CRITICAL CARE PROVIDED
additional history taking/interpretation of diagnostic studies/conducted a detailed discussion of DNR status/consult w/ pt's family directly relating to pts condition/consultation with other physicians/**ATTENDING ADDENDUM (Dr. Rich Baig): Anticipatory guidance provided./documentation/direct patient care (not related to procedure)

## 2018-05-05 NOTE — ED PROVIDER NOTE - CONSTITUTIONAL DISTRESS
no apparent/**ATTENDING ADDENDUM (Dr. Rich Baig): intubated, mechanically ventilated, previously sedated (NO spontaneous movements)

## 2018-05-05 NOTE — ED PROVIDER NOTE - CONDUCTED A DETAILED DISCUSSION WITH PATIENT AND/OR GUARDIAN REGARDING, MDM
DNR/**ATTENDING ADDENDUM (Dr. Rich Baig): Anticipatory guidance provided re: goals/plan of ED care./need to admit/radiology results

## 2018-05-05 NOTE — ED ADULT NURSE NOTE - OBJECTIVE STATEMENT
73 yo male presents to the ED from [facility] for neurology consult and palliative care s/p diagnosis of midline shift and intracerebral hemorrhage this AM. EMS states patient was last seen normal last night at 2330. on arrival, patient is intubated with lip of 25, tube size of 7.5. Versed drip infusing at 6mg/hr. Versed drip stopped on arrival as per MD Myers request. patient is intubated and ventilated at [facility] with vent settings continuing at a of PEEP 5, RR=14, TV= 450. patient pupils are fixed with pupil size of 4mm bilaterally. patient does not respond to pain in all extremities. GCS= 3. girard catheter placed at [facility] with 100cc clear yellow urine draining on arrival to ED. sinus tachycardia on monitor with UM=807. BP= 150s/90s on arrival with axillary temperature of 100. lung sounds clear bilaterally. cap refill <3sec. abdomen is soft, non-tender, non-distended. skin intact. no s/s pain or acute distress noted. 73 yo male presents to the ED from [facility] for neurology consult and palliative care s/p diagnosis of midline shift and intracerebral hemorrhage with uncal herniation this AM. EMS states patient was last seen normal last night at 2330. on arrival, patient is intubated with lip of 25, tube size of 7.5. Versed drip infusing at 6mg/hr. Versed drip stopped on arrival as per MD Myers request. patient is intubated and ventilated at [facility] with vent settings continuing at a of PEEP 5, RR=14, TV= 450. patient pupils are fixed with pupil size of 4mm bilaterally. patient does not respond to pain in all extremities. GCS= 3. girard catheter placed at [facility] with 100cc clear yellow urine draining on arrival to ED. sinus tachycardia on monitor with HA=195. BP= 150s/90s on arrival with axillary temperature of 100. lung sounds clear bilaterally. cap refill <3sec. abdomen is soft, non-tender, non-distended. skin intact. no s/s pain or acute distress noted. 71 yo male presents to the ED from [facility] for neurology consult and palliative care s/p diagnosis of midline shift and intracerebral hemorrhage with uncal herniation this AM. EMS states patient was last seen normal last night at 2330. family states patient was found this AM unresponsive, stiff in posture and difficulty breathing. on arrival, patient is intubated with lip of 25, tube size of 7.5. Versed drip infusing at 6mg/hr. Versed drip stopped on arrival as per MD Myers request. patient is intubated and ventilated at [facility] with vent settings continuing at a of PEEP 5, RR=14, TV= 450. patient pupils are fixed with pupil size of 4mm bilaterally. patient does not respond to pain in all extremities. GCS= 3. girard catheter placed at [facility] with 100cc clear yellow urine draining on arrival to ED. sinus tachycardia on monitor with ES=011. BP= 150s/90s on arrival with axillary temperature of 100. lung sounds clear bilaterally. cap refill <3sec. abdomen is soft, non-tender, non-distended. skin intact. no s/s pain or acute distress noted.

## 2018-05-05 NOTE — ED PROVIDER NOTE - CHIEF COMPLAINT
The patient is a 72y Male complaining of The patient is a 72y Male s/p transfer from [facility]) with massive intracerebral hemorrhage (intraventricular, intraparenchymal, subarachnoid hemorrhage)

## 2018-05-05 NOTE — ED PROVIDER NOTE - OBJECTIVE STATEMENT
71 yo M found unresponsive by wife this morning.  Pt. was stiff and couldn't move.  Having difficulty breathing.  Last known normal was 11:30 pm yesterday--bed time.  Pt. cannot give history.   ROS: unobtainable from patient   PMH: IDDM, depression, HTN, Myasthenia Gravis, hypothyroid, ? transplant; Meds: asa 81, Lantus, ranitidine, warfarin, Cymbalta, carvedilol, warfarin, venlafaxine 150, cellcept 500, levothyroxine 150 ug, lyrica 50; SH: unknown

## 2018-05-05 NOTE — ED PROVIDER NOTE - OBJECTIVE STATEMENT
71 y/o M presents as transfer from [facility] 71 y/o M presents as transfer from OSH for massive intracranial hemorrhage w/ uncal herniation, per EMS last normal was 23:20 yesterday; pt has fixed pupils and GCS of 3. 73 y/o M presents as transfer from OSH for massive intracranial hemorrhage w/ uncal herniation, per EMS last normal was 23:20 yesterday; pt has fixed pupils and GCS of 3.  **ATTENDING ADDENDUM (Dr. Rich Baig): I attest that I have directly and personally interviewed and examined this patient and elicited a comparable history of present illness and review of systems as documented, along with my EM resident. I attest that I have made significant contributions to the documentation where necessary and as noted in the EMR. Of note, and in addition to the above, wife reported that patient was noted earlier this AM with acute change in loss of consciousness and sudden disorientation without seizure acvtivity. Presented to [facility], where CT revealed hemorrhage. Endotracheal intubation, mechanical ventilation, BP control, and sedation initiated. Transferred for emergent neurosurgical evaluation for possible intervention.

## 2018-05-05 NOTE — ED PROVIDER NOTE - CHPI ED SYMPTOMS POS
CHANGE IN LEVEL OF CONSCIOUSNESS/**ATTENDING ADDENDUM (Dr. Rich Baig): known massive intracerebral hemorrhage

## 2018-05-05 NOTE — ED PROVIDER NOTE - PHYSICAL EXAMINATION
Gen: stiff posture, spontaneous respirations intact, muscle stone present, no spontaneous movement  Head: ncat, pupils fixed 5 mm, minimal reactivity, no spontaneous EOM, clonus  Neck: supple, no lymphadenopathy, no midline deviation  Heart: rrr, no m/r/g  Lungs: CTA b/l, no rales/ronchi/wheezes  Abd: soft, nontender, non-distended, no rebound or guarding  Ext: no clubbing/cyanosis/edema  Neuro: tense musculature, no spontaneous movement, diffuse clonus

## 2018-05-05 NOTE — ED PROVIDER NOTE - RESPIRATORY, MLM
Breath sounds clear and equal bilaterally. **ATTENDING ADDENDUM (Dr. Rich Baig): mechanically ventilated and endotracheally intubated.

## 2018-05-05 NOTE — ED PROVIDER NOTE - CARE PLAN
Principal Discharge DX:	Intracerebral hemorrhage Principal Discharge DX:	Intracerebral hemorrhage  Goal:	ATTENDING ADDENDUM (Dr. Rich Baig): Goals of care include resolution of emergent/urgent symptoms and concerns, and restoration to baseline level of homeostasis.

## 2018-05-05 NOTE — ED PROVIDER NOTE - CARE PLAN
Principal Discharge DX:	Serotonin syndrome Principal Discharge DX:	Intraparenchymal hemorrhage of brain

## 2018-05-05 NOTE — ED PROVIDER NOTE - PHYSICAL EXAMINATION
Eyes:  pupils 4mm and nonreactive bilaterally  ENT: Moist mucous membranes. No exudates  Neck: supple, no LAD, mass or goiter, trachea midline  CV: RRR. Audible S1 and S2. No murmurs, rubs, gallops, S3, nor S4  Pulm: Clear to auscultation bilaterally. No wheezes, rales, or rhonchi  Abd: BS+, nondistended, No tenderness to palpation  Musculoskeletal:  No edema  Skin: no lesions or scars noted  Neurologic: GCS 3, no response to pain, no spontaneous movement Eyes:  pupils 4mm and nonreactive bilaterally  ENT: Moist mucous membranes. No exudates  Neck: supple, no LAD, mass or goiter, trachea midline  CV: RRR. Audible S1 and S2. No murmurs, rubs, gallops, S3, nor S4  Pulm: Clear to auscultation bilaterally. No wheezes, rales, or rhonchi  Abd: BS+, nondistended, No tenderness to palpation  Musculoskeletal:  No edema  Skin: no lesions or scars noted  Neurologic: GCS 3, no response to pain, no spontaneous movement  **ATTENDING ADDENDUM (Dr. Rich Baig): I have reviewed and substantially contributed to the elements of the PE as documented above. I have directly performed an examination of this patient in conjunction with the other members (EM resident/PA/NP) of the patient care team.

## 2018-05-06 VITALS — HEART RATE: 98 BPM | OXYGEN SATURATION: 100 %

## 2018-05-06 LAB
CULTURE RESULTS: NO GROWTH — SIGNIFICANT CHANGE UP
SPECIMEN SOURCE: SIGNIFICANT CHANGE UP

## 2018-05-06 NOTE — DISCHARGE NOTE FOR THE EXPIRED PATIENT - HOSPITAL COURSE
Patient was a 71 yo male with hx of MG, anti-phospholipid syndrome on Coumadin who was found unresponsive by his wife this morning.  As per notes, patients last known normal state was last night.  At OSH pt was found to have massive ICH with uncal herniation.  Pt was subsequently transferred to [facility], and the decision was made for patient to be admitted to the PCU for further management as he was not a candidate for surgical intervention.  In the ED the pt received Vitamin K and Kcentra. He was DNR/DNI as per living will. Patient was transferred to the palliative care unit for comfort care.        Intracerebral hemorrhage-  Etiology unknown, however given pt was on a/c, suspect this is 2/2 to the medication.  GCS of 3  - poor prognosis     Patient  at 5:09 am. Family made aware.

## 2018-07-11 ENCOUNTER — APPOINTMENT (OUTPATIENT)
Dept: NEUROLOGY | Facility: CLINIC | Age: 73
End: 2018-07-11

## 2018-11-30 NOTE — PROVIDER CONTACT NOTE (OTHER) - DATE AND TIME:
2018    DO Kalin Torres [id number]    Patient: Brenden Meneses   YOB: 1936   Date of Visit: 2018     Encounter Diagnosis     ICD-10-CM    1  Bilateral leg numbness R20 0    2  Lumbar spondylosis M47 816    3  Bilateral lumbar radiculopathy M54 16        Dear Dr Caba Kappa:    Please review the attached addendum from Trey Blanchard recent visit  Patient has self discharged from PT services  If you have any questions or concerns, please don't hesitate to call  Sincerely,    Fabian Moise, PT                        Charlottelinette Anthony DO [address] [facility] [place]          Daily Note     Today's date: 2018  Patient name: Brenden Meneses  : 1936  MRN: [id number]  Referring provider: Michael Ro DO  Dx:   Encounter Diagnosis     ICD-10-CM    1  Bilateral leg numbness R20 0    2  Lumbar spondylosis M47 816    3  Bilateral lumbar radiculopathy M54 16                   Subjective: Mya Olson reports that his pain level when he started driving here: less then 1/10  States pain has already resolved  States his numbness has resolved  He played guitar for 30 min the other day with no numbness  Objective: See treatment diary below  Precautions asthma, [place], HTN, renal disorder, cardiac stent,     Specialty Daily Treatment Diary       Visits: 6 7  4 5    Manual 18   STM    performed focus on MI and STM on right QL    L/S p-a mobs        Man Flexibility        Manual sciatic nerve stretch                MET        Total time: Not performed   Not performed    12 min    np   Exercise Diary  reeval performed discharge      Rec bike/nustep nustep lv 4 10 min with moist heat  lv 4 10 min   52 miles   lv 3 10 min   45 miles    TA activation/exhale with pelvic tilt 10x w/ pelvic tilt 10x w/ pelvic tilt  pelvic tilt 10 x  10x    SKC [id number]  [id number] [id number]  3oanj16    DKC  8iule44   7douf41  7fbjb29    SLS X 3 trials bilaterally 3 trials: right:  Left:      Unilateral heel raise 10 x each with 1 finger support  10 x each (1 finger support)      Pelvic tilt+ hip add iso  + hip abd iso  [id number] each   Pelvic tilt + hip add iso 10 x  + hip add iso  + hip abd iso  3prme14 each     bent leg fall out : opposite side stabilizing Next visit  Red 5xiaz33    Red 3vdwx91    Supine hamstring stretch 64wfgm6  42jxth3       Bridges     [id number]    luciano pose  10 sec x 10   10 sec  X  10 10 sec x 10    Seated sciatic nerve stretch    [id number]     Free squat 2x10 w/ cuing  2x10 5 sec hold       Trunk rotation stretch in supine  Discussed       Total gym squats        seated lumbar flexion with ball roll out  5 sec x 10  5 sec x 5     Total time: 43 min  45 min   40 min  43 min            Modalities        MH        ice        Total time:              Assessment: patient is independent in comprehensive HEP  Reviewed and patient demonstrated a good understanding of proper form of sitting posture with guitar  Including changing position frequently, using a seat cushion that allows him to change the pressure points, using guitar attachments to avoid having to lift his leg  Issued green t-band for HEP  Patient instructed to contact physician or therapist should symptoms return  Needed cuing to perform abdominal bracing with single heel raise to improve stability  Plan: D/C to HEP  Please refer to last visit reeval for d/c status  06-May-2018 05:07

## 2021-08-04 NOTE — ED ADULT NURSE NOTE - CHIEF COMPLAINT QUOTE
pt fell out of bed, denies any head injury or loc per spouse stated by EMS. Pt was unable to get up from floor due to generalize weakness. Pt has hx of myasthenia gravis
no